# Patient Record
Sex: FEMALE | Race: WHITE | Employment: OTHER | URBAN - METROPOLITAN AREA
[De-identification: names, ages, dates, MRNs, and addresses within clinical notes are randomized per-mention and may not be internally consistent; named-entity substitution may affect disease eponyms.]

---

## 2021-01-26 RX ORDER — ACETAMINOPHEN 500 MG
1000 TABLET ORAL ONCE
Status: CANCELLED | OUTPATIENT
Start: 2021-01-26 | End: 2021-01-26

## 2021-02-12 ENCOUNTER — LAB ENCOUNTER (OUTPATIENT)
Dept: LAB | Facility: HOSPITAL | Age: 71
End: 2021-02-12
Attending: ORTHOPAEDIC SURGERY
Payer: COMMERCIAL

## 2021-02-12 DIAGNOSIS — M16.12 PRIMARY OSTEOARTHRITIS OF LEFT HIP: ICD-10-CM

## 2021-02-12 LAB
ANTIBODY SCREEN: NEGATIVE
RH BLOOD TYPE: POSITIVE
SARS-COV-2 RNA RESP QL NAA+PROBE: NOT DETECTED

## 2021-02-12 PROCEDURE — 86850 RBC ANTIBODY SCREEN: CPT

## 2021-02-12 PROCEDURE — 86901 BLOOD TYPING SEROLOGIC RH(D): CPT

## 2021-02-12 PROCEDURE — 86900 BLOOD TYPING SEROLOGIC ABO: CPT

## 2021-02-12 NOTE — H&P (VIEW-ONLY)
Patient ID: Jim Lamas     Chief Complaint:    Patient presents with:  Pre-Op: pre op left total hip 2/15/21 EH       HPI:    Jim Lamas is a 78year old female who presents today for Patient presents with:  Pre-Op: pre op left total hip 2/15/21 History:   Procedure Laterality Date   • BACK SURGERY  2006    s/p L3-5 laminectomy   • COLONOSCOPY  2007, int hem   • COLONOSCOPY  8/7/20= Diverticulosis, Hemorrhoids    Repeat PRN   • COLONOSCOPY, POSSIBLE BIOPSY, POSSIBLE POLYPECTOMY 39454 N/A 8/7/2020 stools or diarrhea   Musculoskeletal:  Denies numbness tingling or loss of motor function except as outlined above in history of present illness.   Integument:  Denies rash, lesion or ulceration   Neurologic:  Denies headache or focal weakness  Lymphatics: There is moderate to severe joint space narrowing  with mild osteophyte formation off the acetabulum and the head neck junction. There is sclerosis noted in the femoral head and acetabulum. There is no fracture or dislocation.   No periosteal reactions or injury, fracture, dislocation, nonunion, malunion, need for further surgery including hardware failure requiring revision, and continued pain.   It was explained that if tissue has been repaired or reconstructed, there is also a chance of failure which may time and need to be replaced. After having surgery, you may lose your balance and be more likely to fall for a time. You can dislocate your hip which would require an additional procedure and potentially surgery to correct.   You may get a blood clot in

## 2021-02-15 ENCOUNTER — APPOINTMENT (OUTPATIENT)
Dept: GENERAL RADIOLOGY | Facility: HOSPITAL | Age: 71
End: 2021-02-15
Attending: ORTHOPAEDIC SURGERY
Payer: COMMERCIAL

## 2021-02-15 ENCOUNTER — HOSPITAL ENCOUNTER (OUTPATIENT)
Facility: HOSPITAL | Age: 71
Discharge: HOME HEALTH | End: 2021-02-16
Attending: ORTHOPAEDIC SURGERY | Admitting: ORTHOPAEDIC SURGERY
Payer: COMMERCIAL

## 2021-02-15 ENCOUNTER — ANESTHESIA EVENT (OUTPATIENT)
Dept: SURGERY | Facility: HOSPITAL | Age: 71
End: 2021-02-15
Payer: COMMERCIAL

## 2021-02-15 ENCOUNTER — ANESTHESIA (OUTPATIENT)
Dept: SURGERY | Facility: HOSPITAL | Age: 71
End: 2021-02-15
Payer: COMMERCIAL

## 2021-02-15 DIAGNOSIS — M16.12 PRIMARY OSTEOARTHRITIS OF LEFT HIP: Primary | ICD-10-CM

## 2021-02-15 PROCEDURE — 88311 DECALCIFY TISSUE: CPT | Performed by: ORTHOPAEDIC SURGERY

## 2021-02-15 PROCEDURE — 76000 FLUOROSCOPY <1 HR PHYS/QHP: CPT | Performed by: ORTHOPAEDIC SURGERY

## 2021-02-15 PROCEDURE — 88304 TISSUE EXAM BY PATHOLOGIST: CPT | Performed by: ORTHOPAEDIC SURGERY

## 2021-02-15 PROCEDURE — 0SRB04Z REPLACEMENT OF LEFT HIP JOINT WITH CERAMIC ON POLYETHYLENE SYNTHETIC SUBSTITUTE, OPEN APPROACH: ICD-10-PCS | Performed by: ORTHOPAEDIC SURGERY

## 2021-02-15 DEVICE — PREM ST/G7 CP/E1 AA DM LN CER: Type: IMPLANTABLE DEVICE | Status: FUNCTIONAL

## 2021-02-15 DEVICE — BIOLOX® DELTA HEAD, 12/14, 28 X -3.5
Type: IMPLANTABLE DEVICE | Site: HIP | Status: FUNCTIONAL
Brand: BIOLOX® DELTA

## 2021-02-15 DEVICE — G7 DUAL MBLTY LNR 42MM E: Type: IMPLANTABLE DEVICE | Site: HIP | Status: FUNCTIONAL

## 2021-02-15 RX ORDER — HYDROCODONE BITARTRATE AND ACETAMINOPHEN 5; 325 MG/1; MG/1
1 TABLET ORAL AS NEEDED
Status: DISCONTINUED | OUTPATIENT
Start: 2021-02-15 | End: 2021-02-15 | Stop reason: HOSPADM

## 2021-02-15 RX ORDER — NEOSTIGMINE METHYLSULFATE 1 MG/ML
INJECTION INTRAVENOUS AS NEEDED
Status: DISCONTINUED | OUTPATIENT
Start: 2021-02-15 | End: 2021-02-15 | Stop reason: SURG

## 2021-02-15 RX ORDER — DIPHENHYDRAMINE HCL 25 MG
25 CAPSULE ORAL EVERY 4 HOURS PRN
Status: DISCONTINUED | OUTPATIENT
Start: 2021-02-15 | End: 2021-02-16

## 2021-02-15 RX ORDER — NALOXONE HYDROCHLORIDE 0.4 MG/ML
80 INJECTION, SOLUTION INTRAMUSCULAR; INTRAVENOUS; SUBCUTANEOUS AS NEEDED
Status: DISCONTINUED | OUTPATIENT
Start: 2021-02-15 | End: 2021-02-15 | Stop reason: HOSPADM

## 2021-02-15 RX ORDER — DOCUSATE SODIUM 100 MG/1
100 CAPSULE, LIQUID FILLED ORAL 2 TIMES DAILY
Status: DISCONTINUED | OUTPATIENT
Start: 2021-02-15 | End: 2021-02-16

## 2021-02-15 RX ORDER — KETOROLAC TROMETHAMINE 30 MG/ML
15 INJECTION, SOLUTION INTRAMUSCULAR; INTRAVENOUS EVERY 6 HOURS
Status: COMPLETED | OUTPATIENT
Start: 2021-02-15 | End: 2021-02-16

## 2021-02-15 RX ORDER — SENNOSIDES 8.6 MG
17.2 TABLET ORAL NIGHTLY
Status: DISCONTINUED | OUTPATIENT
Start: 2021-02-15 | End: 2021-02-16

## 2021-02-15 RX ORDER — LIDOCAINE HYDROCHLORIDE 10 MG/ML
INJECTION, SOLUTION EPIDURAL; INFILTRATION; INTRACAUDAL; PERINEURAL AS NEEDED
Status: DISCONTINUED | OUTPATIENT
Start: 2021-02-15 | End: 2021-02-15 | Stop reason: SURG

## 2021-02-15 RX ORDER — TRANEXAMIC ACID 10 MG/ML
1000 INJECTION, SOLUTION INTRAVENOUS ONCE
Status: COMPLETED | OUTPATIENT
Start: 2021-02-15 | End: 2021-02-15

## 2021-02-15 RX ORDER — HYDROMORPHONE HYDROCHLORIDE 1 MG/ML
INJECTION, SOLUTION INTRAMUSCULAR; INTRAVENOUS; SUBCUTANEOUS
Status: COMPLETED
Start: 2021-02-15 | End: 2021-02-15

## 2021-02-15 RX ORDER — ASPIRIN 81 MG/1
81 TABLET ORAL 2 TIMES DAILY
Status: DISCONTINUED | OUTPATIENT
Start: 2021-02-15 | End: 2021-02-16

## 2021-02-15 RX ORDER — SODIUM PHOSPHATE, DIBASIC AND SODIUM PHOSPHATE, MONOBASIC 7; 19 G/133ML; G/133ML
1 ENEMA RECTAL ONCE AS NEEDED
Status: DISCONTINUED | OUTPATIENT
Start: 2021-02-15 | End: 2021-02-16

## 2021-02-15 RX ORDER — SODIUM CHLORIDE, SODIUM LACTATE, POTASSIUM CHLORIDE, CALCIUM CHLORIDE 600; 310; 30; 20 MG/100ML; MG/100ML; MG/100ML; MG/100ML
INJECTION, SOLUTION INTRAVENOUS CONTINUOUS
Status: DISCONTINUED | OUTPATIENT
Start: 2021-02-15 | End: 2021-02-16

## 2021-02-15 RX ORDER — HYDROMORPHONE HYDROCHLORIDE 1 MG/ML
0.4 INJECTION, SOLUTION INTRAMUSCULAR; INTRAVENOUS; SUBCUTANEOUS EVERY 2 HOUR PRN
Status: DISCONTINUED | OUTPATIENT
Start: 2021-02-15 | End: 2021-02-16

## 2021-02-15 RX ORDER — DEXAMETHASONE SODIUM PHOSPHATE 10 MG/ML
8 INJECTION, SOLUTION INTRAMUSCULAR; INTRAVENOUS ONCE
Status: COMPLETED | OUTPATIENT
Start: 2021-02-16 | End: 2021-02-16

## 2021-02-15 RX ORDER — MIDAZOLAM HYDROCHLORIDE 1 MG/ML
INJECTION INTRAMUSCULAR; INTRAVENOUS
Status: COMPLETED
Start: 2021-02-15 | End: 2021-02-15

## 2021-02-15 RX ORDER — DEXAMETHASONE SODIUM PHOSPHATE 4 MG/ML
VIAL (ML) INJECTION AS NEEDED
Status: DISCONTINUED | OUTPATIENT
Start: 2021-02-15 | End: 2021-02-15 | Stop reason: SURG

## 2021-02-15 RX ORDER — ONDANSETRON 2 MG/ML
INJECTION INTRAMUSCULAR; INTRAVENOUS AS NEEDED
Status: DISCONTINUED | OUTPATIENT
Start: 2021-02-15 | End: 2021-02-15 | Stop reason: SURG

## 2021-02-15 RX ORDER — SODIUM CHLORIDE 9 MG/ML
INJECTION, SOLUTION INTRAVENOUS CONTINUOUS
Status: DISCONTINUED | OUTPATIENT
Start: 2021-02-15 | End: 2021-02-16

## 2021-02-15 RX ORDER — HYDROCODONE BITARTRATE AND ACETAMINOPHEN 5; 325 MG/1; MG/1
2 TABLET ORAL AS NEEDED
Status: DISCONTINUED | OUTPATIENT
Start: 2021-02-15 | End: 2021-02-15 | Stop reason: HOSPADM

## 2021-02-15 RX ORDER — BISACODYL 10 MG
10 SUPPOSITORY, RECTAL RECTAL
Status: DISCONTINUED | OUTPATIENT
Start: 2021-02-15 | End: 2021-02-16

## 2021-02-15 RX ORDER — ONDANSETRON 2 MG/ML
4 INJECTION INTRAMUSCULAR; INTRAVENOUS EVERY 4 HOURS PRN
Status: DISCONTINUED | OUTPATIENT
Start: 2021-02-15 | End: 2021-02-16

## 2021-02-15 RX ORDER — ACETAMINOPHEN 325 MG/1
TABLET ORAL
Status: COMPLETED
Start: 2021-02-15 | End: 2021-02-15

## 2021-02-15 RX ORDER — DIPHENHYDRAMINE HYDROCHLORIDE 50 MG/ML
25 INJECTION INTRAMUSCULAR; INTRAVENOUS ONCE AS NEEDED
Status: ACTIVE | OUTPATIENT
Start: 2021-02-15 | End: 2021-02-15

## 2021-02-15 RX ORDER — GLYCOPYRROLATE 0.2 MG/ML
INJECTION, SOLUTION INTRAMUSCULAR; INTRAVENOUS AS NEEDED
Status: DISCONTINUED | OUTPATIENT
Start: 2021-02-15 | End: 2021-02-15 | Stop reason: SURG

## 2021-02-15 RX ORDER — TRAMADOL HYDROCHLORIDE 50 MG/1
50 TABLET ORAL EVERY 6 HOURS
Status: DISCONTINUED | OUTPATIENT
Start: 2021-02-15 | End: 2021-02-16

## 2021-02-15 RX ORDER — HYDROMORPHONE HYDROCHLORIDE 1 MG/ML
0.4 INJECTION, SOLUTION INTRAMUSCULAR; INTRAVENOUS; SUBCUTANEOUS EVERY 5 MIN PRN
Status: DISCONTINUED | OUTPATIENT
Start: 2021-02-15 | End: 2021-02-15 | Stop reason: HOSPADM

## 2021-02-15 RX ORDER — ONDANSETRON 2 MG/ML
4 INJECTION INTRAMUSCULAR; INTRAVENOUS AS NEEDED
Status: DISCONTINUED | OUTPATIENT
Start: 2021-02-15 | End: 2021-02-15 | Stop reason: HOSPADM

## 2021-02-15 RX ORDER — CEFAZOLIN SODIUM/WATER 2 G/20 ML
2 SYRINGE (ML) INTRAVENOUS EVERY 8 HOURS
Status: COMPLETED | OUTPATIENT
Start: 2021-02-15 | End: 2021-02-16

## 2021-02-15 RX ORDER — KETOROLAC TROMETHAMINE 30 MG/ML
INJECTION, SOLUTION INTRAMUSCULAR; INTRAVENOUS
Status: COMPLETED
Start: 2021-02-15 | End: 2021-02-15

## 2021-02-15 RX ORDER — PROCHLORPERAZINE EDISYLATE 5 MG/ML
10 INJECTION INTRAMUSCULAR; INTRAVENOUS EVERY 6 HOURS PRN
Status: DISCONTINUED | OUTPATIENT
Start: 2021-02-15 | End: 2021-02-16

## 2021-02-15 RX ORDER — POLYETHYLENE GLYCOL 3350 17 G/17G
17 POWDER, FOR SOLUTION ORAL DAILY PRN
Status: DISCONTINUED | OUTPATIENT
Start: 2021-02-15 | End: 2021-02-16

## 2021-02-15 RX ORDER — ACETAMINOPHEN 500 MG
1000 TABLET ORAL EVERY 6 HOURS PRN
Status: ON HOLD | COMMUNITY
End: 2021-02-16

## 2021-02-15 RX ORDER — ROCURONIUM BROMIDE 10 MG/ML
INJECTION, SOLUTION INTRAVENOUS AS NEEDED
Status: DISCONTINUED | OUTPATIENT
Start: 2021-02-15 | End: 2021-02-15 | Stop reason: SURG

## 2021-02-15 RX ORDER — DIPHENHYDRAMINE HYDROCHLORIDE 50 MG/ML
12.5 INJECTION INTRAMUSCULAR; INTRAVENOUS EVERY 4 HOURS PRN
Status: DISCONTINUED | OUTPATIENT
Start: 2021-02-15 | End: 2021-02-16

## 2021-02-15 RX ORDER — OXYCODONE HYDROCHLORIDE 5 MG/1
2.5 TABLET ORAL EVERY 4 HOURS PRN
Status: DISCONTINUED | OUTPATIENT
Start: 2021-02-15 | End: 2021-02-16

## 2021-02-15 RX ORDER — ACETAMINOPHEN 325 MG/1
650 TABLET ORAL ONCE
Status: COMPLETED | OUTPATIENT
Start: 2021-02-15 | End: 2021-02-15

## 2021-02-15 RX ORDER — ONDANSETRON 2 MG/ML
INJECTION INTRAMUSCULAR; INTRAVENOUS
Status: COMPLETED
Start: 2021-02-15 | End: 2021-02-15

## 2021-02-15 RX ORDER — ACETAMINOPHEN 325 MG/1
650 TABLET ORAL 4 TIMES DAILY
Status: DISCONTINUED | OUTPATIENT
Start: 2021-02-15 | End: 2021-02-16

## 2021-02-15 RX ORDER — OXYCODONE HYDROCHLORIDE 5 MG/1
5 TABLET ORAL EVERY 4 HOURS PRN
Status: DISCONTINUED | OUTPATIENT
Start: 2021-02-15 | End: 2021-02-16

## 2021-02-15 RX ORDER — TEMAZEPAM 15 MG/1
15 CAPSULE ORAL NIGHTLY PRN
Status: DISCONTINUED | OUTPATIENT
Start: 2021-02-15 | End: 2021-02-16

## 2021-02-15 RX ORDER — CEFAZOLIN SODIUM/WATER 2 G/20 ML
2 SYRINGE (ML) INTRAVENOUS ONCE
Status: COMPLETED | OUTPATIENT
Start: 2021-02-15 | End: 2021-02-15

## 2021-02-15 RX ORDER — HYDROMORPHONE HYDROCHLORIDE 1 MG/ML
0.2 INJECTION, SOLUTION INTRAMUSCULAR; INTRAVENOUS; SUBCUTANEOUS EVERY 2 HOUR PRN
Status: DISCONTINUED | OUTPATIENT
Start: 2021-02-15 | End: 2021-02-16

## 2021-02-15 RX ORDER — SODIUM CHLORIDE, SODIUM LACTATE, POTASSIUM CHLORIDE, CALCIUM CHLORIDE 600; 310; 30; 20 MG/100ML; MG/100ML; MG/100ML; MG/100ML
INJECTION, SOLUTION INTRAVENOUS CONTINUOUS
Status: DISCONTINUED | OUTPATIENT
Start: 2021-02-15 | End: 2021-02-15 | Stop reason: HOSPADM

## 2021-02-15 RX ORDER — MIDAZOLAM HYDROCHLORIDE 1 MG/ML
1 INJECTION INTRAMUSCULAR; INTRAVENOUS ONCE
Status: COMPLETED | OUTPATIENT
Start: 2021-02-15 | End: 2021-02-15

## 2021-02-15 RX ADMIN — ROCURONIUM BROMIDE 50 MG: 10 INJECTION, SOLUTION INTRAVENOUS at 14:41:00

## 2021-02-15 RX ADMIN — CEFAZOLIN SODIUM/WATER 2 G: 2 G/20 ML SYRINGE (ML) INTRAVENOUS at 14:46:00

## 2021-02-15 RX ADMIN — TRANEXAMIC ACID 1000 MG: 10 INJECTION, SOLUTION INTRAVENOUS at 14:46:00

## 2021-02-15 RX ADMIN — SODIUM CHLORIDE, SODIUM LACTATE, POTASSIUM CHLORIDE, CALCIUM CHLORIDE: 600; 310; 30; 20 INJECTION, SOLUTION INTRAVENOUS at 16:16:00

## 2021-02-15 RX ADMIN — ROCURONIUM BROMIDE 15 MG: 10 INJECTION, SOLUTION INTRAVENOUS at 14:54:00

## 2021-02-15 RX ADMIN — LIDOCAINE HYDROCHLORIDE 25 MG: 10 INJECTION, SOLUTION EPIDURAL; INFILTRATION; INTRACAUDAL; PERINEURAL at 14:41:00

## 2021-02-15 RX ADMIN — DEXAMETHASONE SODIUM PHOSPHATE 4 MG: 4 MG/ML VIAL (ML) INJECTION at 14:52:00

## 2021-02-15 RX ADMIN — GLYCOPYRROLATE 0.8 MG: 0.2 INJECTION, SOLUTION INTRAMUSCULAR; INTRAVENOUS at 16:12:00

## 2021-02-15 RX ADMIN — SODIUM CHLORIDE, SODIUM LACTATE, POTASSIUM CHLORIDE, CALCIUM CHLORIDE: 600; 310; 30; 20 INJECTION, SOLUTION INTRAVENOUS at 16:26:00

## 2021-02-15 RX ADMIN — NEOSTIGMINE METHYLSULFATE 4.5 MG: 1 INJECTION INTRAVENOUS at 16:12:00

## 2021-02-15 RX ADMIN — ONDANSETRON 4 MG: 2 INJECTION INTRAMUSCULAR; INTRAVENOUS at 16:00:00

## 2021-02-15 NOTE — INTERVAL H&P NOTE
Pre-op Diagnosis: Primary osteoarthritis of left hip [M16.12]    The above referenced H&P was reviewed by Eze Garcia MD on 2/15/2021, the patient was examined and no significant changes have occurred in the patient's condition since the H&P was perform

## 2021-02-15 NOTE — ANESTHESIA PREPROCEDURE EVALUATION
PRE-OP EVALUATION    Patient Name: Jenise Sutherland    Pre-op Diagnosis: Primary osteoarthritis of left hip [M16.12]    Procedure(s):  LEFT ANTERIOR TOTAL HIP ARTHROPLASTY     Surgeon(s) and Role:     Glynn Acuna MD - Primary    Pre-op jordyn Alonso Evaluation        Anesthetic Complications  (-) history of anesthetic complications         GI/Hepatic/Renal    Negative GI/hepatic/renal ROS. Cardiovascular    Negative cardiovascular ROS.     Exercise tolerance: good     MET: > Airway      Mallampati: II  Mouth opening: >3 FB  TM distance: 4 - 6 cm  Neck ROM: full Cardiovascular      Rhythm: regular  Rate: normal     Dental             Pulmonary      Breath sounds clear to auscultation bilaterally.                Other fin

## 2021-02-15 NOTE — ANESTHESIA POSTPROCEDURE EVALUATION
Altaf Patient Status:  Outpatient in a Bed   Age/Gender 78year old female MRN [id number]   [facility] SURGERY Attending Rao Christianson MD   Hosp Day # 0 PCP Deana Costello MD       Anesthesia Post-op Not

## 2021-02-15 NOTE — OPERATIVE REPORT
OPERATIVE REPORT    Facility:  [facility]    Patient Name:  Kasi Singer    Age/Gender:  78year old female  :  1950    MRN:  [id number]    Date of Operation:  2/15/2021    Preoperative Diagnosis:  LEFT HIP OSTEOARTHRITIS    Postopera aspect of the femoral neck. Osteotomy of the femoral neck was performed using a sagittal saw. A corkscrew was used to remove the femoral head. Retractors were placed anterior and posterior to the acetabulum.   The hip labrum as well as the soft tissue in together closing the anterior hip capsule. Local anesthetic and pain medicine was injected into the hip capsule and surrounding soft tissues. The fascia nicky was closed with absorbable suture. Skin was then closed in 2 layers with absorbable suture.   Kemar Burks

## 2021-02-16 ENCOUNTER — APPOINTMENT (OUTPATIENT)
Dept: GENERAL RADIOLOGY | Facility: HOSPITAL | Age: 71
End: 2021-02-16
Attending: ORTHOPAEDIC SURGERY
Payer: COMMERCIAL

## 2021-02-16 VITALS
RESPIRATION RATE: 18 BRPM | HEIGHT: 69 IN | TEMPERATURE: 99 F | DIASTOLIC BLOOD PRESSURE: 37 MMHG | HEART RATE: 57 BPM | WEIGHT: 198 LBS | BODY MASS INDEX: 29.33 KG/M2 | OXYGEN SATURATION: 94 % | SYSTOLIC BLOOD PRESSURE: 110 MMHG

## 2021-02-16 PROCEDURE — 97161 PT EVAL LOW COMPLEX 20 MIN: CPT

## 2021-02-16 PROCEDURE — 97535 SELF CARE MNGMENT TRAINING: CPT

## 2021-02-16 PROCEDURE — 97165 OT EVAL LOW COMPLEX 30 MIN: CPT

## 2021-02-16 PROCEDURE — 97116 GAIT TRAINING THERAPY: CPT

## 2021-02-16 PROCEDURE — 73501 X-RAY EXAM HIP UNI 1 VIEW: CPT | Performed by: ORTHOPAEDIC SURGERY

## 2021-02-16 RX ORDER — NALOXONE HYDROCHLORIDE 4 MG/.1ML
4 SPRAY, METERED NASAL AS NEEDED
Qty: 1 KIT | Refills: 0 | Status: SHIPPED | OUTPATIENT
Start: 2021-02-16 | End: 2021-05-26

## 2021-02-16 RX ORDER — ASPIRIN 81 MG/1
81 TABLET ORAL 2 TIMES DAILY
Qty: 84 TABLET | Refills: 0 | Status: SHIPPED | OUTPATIENT
Start: 2021-02-16 | End: 2021-05-26

## 2021-02-16 RX ORDER — ACETAMINOPHEN 325 MG/1
650 TABLET ORAL EVERY 4 HOURS
Qty: 168 TABLET | Refills: 0 | Status: SHIPPED | OUTPATIENT
Start: 2021-02-16 | End: 2021-03-02

## 2021-02-16 RX ORDER — CELECOXIB 200 MG/1
200 CAPSULE ORAL DAILY
Qty: 30 CAPSULE | Refills: 0 | Status: SHIPPED | OUTPATIENT
Start: 2021-02-16 | End: 2021-03-18

## 2021-02-16 RX ORDER — ONDANSETRON 4 MG/1
4 TABLET, FILM COATED ORAL EVERY 8 HOURS PRN
Qty: 30 TABLET | Refills: 0 | Status: SHIPPED | OUTPATIENT
Start: 2021-02-16 | End: 2021-05-26

## 2021-02-16 RX ORDER — TRAMADOL HYDROCHLORIDE 50 MG/1
50 TABLET ORAL
Qty: 40 TABLET | Refills: 0 | Status: SHIPPED | OUTPATIENT
Start: 2021-02-16 | End: 2021-05-26

## 2021-02-16 RX ORDER — OXYCODONE HYDROCHLORIDE 5 MG/1
2.5 TABLET ORAL EVERY 4 HOURS PRN
Qty: 30 TABLET | Refills: 0 | Status: SHIPPED | OUTPATIENT
Start: 2021-02-16 | End: 2021-05-26

## 2021-02-16 RX ORDER — SENNA AND DOCUSATE SODIUM 50; 8.6 MG/1; MG/1
1 TABLET, FILM COATED ORAL DAILY
Qty: 20 TABLET | Refills: 2 | Status: SHIPPED | OUTPATIENT
Start: 2021-02-16 | End: 2021-05-26

## 2021-02-16 NOTE — PLAN OF CARE
Patient doing well, reported having minimal pain to left surgical hip. Aquacel dressing to left hip looks clean, dry and intact. Denies having numbness to LLE. PT and OT seen patient. Ambulated in the gutierrez and did stairs.   Dr. Salina Sheppard seen and examined

## 2021-02-16 NOTE — PROGRESS NOTES
Progress Note    Patient: Rob Rainey  Medical record #: [id number]    Rob Rainey is a 78year old  female who is POD #1 left UZMA. The patient's main complaint today is surgical pain at the operative site. Denies paresthesias/CP/SOA.  Gali Mcdonnell QID    •  oxyCODONE HCl (OXY-IR) cap/tab 2.5 mg, 2.5 mg, Oral, Q4H PRN    Or    •  oxyCODONE HCl (OXY-IR) cap/tab 5 mg, 5 mg, Oral, Q4H PRN    •  HYDROmorphone HCl (DILAUDID) 1 MG/ML injection 0.2 mg, 0.2 mg, Intravenous, Q2H PRN    Or    •  HYDROmorphone swelling  Bernie's sign negative  Compartments soft  No signs or symptoms of DVT or compartment syndrome      Labs:          Assessment: 78year old  female POD #1 left UZMA    Plan:  Karla-op Glucose Goal control <140  TEDs, SCDs, IS  mobilize with PT, WBAT

## 2021-02-16 NOTE — OCCUPATIONAL THERAPY NOTE
OCCUPATIONAL THERAPY QUICK EVALUATION - INPATIENT    Room Number: 379/379-A  Evaluation Date: 2/16/2021     Type of Evaluation: Quick Eval  Presenting Problem: s/p L UZMA on 2/15     Physician Order: IP Consult to Occupational Therapy  Reason for Therapy: Dominance: Right  Drives: Yes  Patient Regularly Uses: Reading glasses    Prior Level of Function: Pt lives at home with her . Pt is typically independent with all ADL and functional mobility without a device. Pt's spouse can assist as needed.      Marion Stewart supervision while sitting at EOB. Pt performed sit>stand with RW and supervision assistance with min verbal/visual/tactile cues for safety with RW use and hand placement.  Pt performed functional mobility with supervision assistance and RW x approx 20ft>bat no skilled Occupational Therapy needs at this time. Patient discharged from Occupational Therapy services. Please re-order if a new functional limitation presents during this admission.     Patient was able to achieve the following goals:  Patient able to

## 2021-02-16 NOTE — PLAN OF CARE
Alert and oriented,V/S stable,IVF infusing,pain managed with scheduled medications doing well. Denies nausea,snacks provided. Instructed about using IS,as well as doing foot and ankle pump exercises. Plan of care reinforced,as well as safety. Call light kept w

## 2021-02-16 NOTE — HOME CARE LIAISON
Met with patient at the bedside. Patient is agreeable to [facility]. Residential brochure provided with contact information. All questions addressed and answered. JOSE Olson updated.

## 2021-02-16 NOTE — PHYSICAL THERAPY NOTE
PHYSICAL THERAPY QUICK EVALUATION - INPATIENT    Room Number: 379/379-A  Evaluation Date: 2/16/2021  Presenting Problem: s/p left anterior UZMA 2/15/21  Physician Order: PT Eval and Treat    Problem List  Active Problems:    * No active hospital problems. Techniques:  Activity promotion   RANGE OF MOTION AND STRENGTH ASSESSMENT  Upper extremity ROM and strength are within functional limits     Lower extremity ROM is within functional limits except left hip due to surgery  Lower extremity strength is within f through gait pattern, left hip went from 2 at rest to 6 with activity, however, pt very motivated to amb, tolerated well despite pain. Pt amb to and from PT gym with seated rest break between, good tolerance.      Pt instructed in amb on stairs with use of admission. GOALS  Patient was able to achieve the following goals . ..     Patient was able to transfer Safely and independently   Patient able to ambulate on level surfaces Safely and independently  supervision   PPE worn by therapist throughout session,

## 2021-02-16 NOTE — PROGRESS NOTES
Austyn at bedside. Reviewed indications, side effects of pain medication/narcotics and constipation prevention.  Stressed importance of increased fluids/roughage in diet, continued use of stool softeners along with laxatives and suppositories as needed

## 2021-02-16 NOTE — CM/SW NOTE
02/16/21 1100   CM/SW Referral Data   Referral Source Social Work (self-referral)   Reason for Referral Discharge planning   Discharge Needs   Anticipated D/C needs Home health care       HOME SITUATION  Type of Home: House   Home Layout: Two level(spli

## 2021-03-01 NOTE — OR PREOP
Attempted to reach patient to follow up on recent visit. LM to call me back if she desires. (Direct phone # provided).

## 2021-05-26 PROBLEM — Z12.11 COLON CANCER SCREENING: Status: ACTIVE | Noted: 2021-05-26

## 2021-07-09 PROCEDURE — 88173 CYTOPATH EVAL FNA REPORT: CPT | Performed by: OTOLARYNGOLOGY

## 2021-07-09 PROCEDURE — 88305 TISSUE EXAM BY PATHOLOGIST: CPT | Performed by: OTOLARYNGOLOGY

## 2021-07-09 PROCEDURE — 88342 IMHCHEM/IMCYTCHM 1ST ANTB: CPT | Performed by: OTOLARYNGOLOGY

## 2021-08-27 ENCOUNTER — LAB ENCOUNTER (OUTPATIENT)
Dept: LAB | Age: 71
End: 2021-08-27
Attending: OTOLARYNGOLOGY
Payer: COMMERCIAL

## 2021-08-27 DIAGNOSIS — D49.7 THYROID NEOPLASM: ICD-10-CM

## 2021-08-29 LAB — SARS-COV-2 RNA RESP QL NAA+PROBE: NOT DETECTED

## 2021-08-30 ENCOUNTER — HOSPITAL ENCOUNTER (OUTPATIENT)
Facility: HOSPITAL | Age: 71
Discharge: HOME | End: 2021-08-31
Attending: OTOLARYNGOLOGY | Admitting: OTOLARYNGOLOGY
Payer: COMMERCIAL

## 2021-08-30 ENCOUNTER — ANESTHESIA (OUTPATIENT)
Dept: SURGERY | Facility: HOSPITAL | Age: 71
End: 2021-08-30
Payer: COMMERCIAL

## 2021-08-30 ENCOUNTER — ANESTHESIA EVENT (OUTPATIENT)
Dept: SURGERY | Facility: HOSPITAL | Age: 71
End: 2021-08-30
Payer: COMMERCIAL

## 2021-08-30 DIAGNOSIS — D49.7 THYROID NEOPLASM: Primary | ICD-10-CM

## 2021-08-30 LAB
ALBUMIN SERPL-MCNC: 3.8 G/DL (ref 3.4–5)
ANION GAP SERPL CALC-SCNC: 5 MMOL/L (ref 0–18)
BUN BLD-MCNC: 15 MG/DL (ref 7–18)
CALCIUM BLD-MCNC: 8.7 MG/DL (ref 8.5–10.1)
CHLORIDE SERPL-SCNC: 113 MMOL/L (ref 98–112)
CO2 SERPL-SCNC: 24 MMOL/L (ref 21–32)
CREAT BLD-MCNC: 0.99 MG/DL
GLUCOSE BLD-MCNC: 129 MG/DL (ref 70–99)
HAV IGM SER QL: 2.1 MG/DL (ref 1.6–2.6)
OSMOLALITY SERPL CALC.SUM OF ELEC: 297 MOSM/KG (ref 275–295)
PHOSPHATE SERPL-MCNC: 2.7 MG/DL (ref 2.5–4.9)
POTASSIUM SERPL-SCNC: 4 MMOL/L (ref 3.5–5.1)
PTH-INTACT SERPL-MCNC: 69.5 PG/ML (ref 18.5–88)
SODIUM SERPL-SCNC: 142 MMOL/L (ref 136–145)

## 2021-08-30 PROCEDURE — 82040 ASSAY OF SERUM ALBUMIN: CPT | Performed by: OTOLARYNGOLOGY

## 2021-08-30 PROCEDURE — 83970 ASSAY OF PARATHORMONE: CPT | Performed by: OTOLARYNGOLOGY

## 2021-08-30 PROCEDURE — 88341 IMHCHEM/IMCYTCHM EA ADD ANTB: CPT | Performed by: OTOLARYNGOLOGY

## 2021-08-30 PROCEDURE — 0GBJ0ZZ EXCISION OF THYROID GLAND ISTHMUS, OPEN APPROACH: ICD-10-PCS | Performed by: OTOLARYNGOLOGY

## 2021-08-30 PROCEDURE — 80048 BASIC METABOLIC PNL TOTAL CA: CPT | Performed by: OTOLARYNGOLOGY

## 2021-08-30 PROCEDURE — 0GTK0ZZ RESECTION OF THYROID GLAND, OPEN APPROACH: ICD-10-PCS | Performed by: OTOLARYNGOLOGY

## 2021-08-30 PROCEDURE — 83735 ASSAY OF MAGNESIUM: CPT | Performed by: OTOLARYNGOLOGY

## 2021-08-30 PROCEDURE — 88321 CONSLTJ&REPRT SLD PREP ELSWR: CPT | Performed by: OTOLARYNGOLOGY

## 2021-08-30 PROCEDURE — 88307 TISSUE EXAM BY PATHOLOGIST: CPT | Performed by: OTOLARYNGOLOGY

## 2021-08-30 PROCEDURE — 88342 IMHCHEM/IMCYTCHM 1ST ANTB: CPT | Performed by: OTOLARYNGOLOGY

## 2021-08-30 PROCEDURE — 84100 ASSAY OF PHOSPHORUS: CPT | Performed by: OTOLARYNGOLOGY

## 2021-08-30 RX ORDER — DEXAMETHASONE SODIUM PHOSPHATE 4 MG/ML
VIAL (ML) INJECTION AS NEEDED
Status: DISCONTINUED | OUTPATIENT
Start: 2021-08-30 | End: 2021-08-30 | Stop reason: SURG

## 2021-08-30 RX ORDER — HYDROCODONE BITARTRATE AND ACETAMINOPHEN 5; 325 MG/1; MG/1
2 TABLET ORAL AS NEEDED
Status: DISCONTINUED | OUTPATIENT
Start: 2021-08-30 | End: 2021-08-30 | Stop reason: HOSPADM

## 2021-08-30 RX ORDER — CEFAZOLIN SODIUM/WATER 2 G/20 ML
SYRINGE (ML) INTRAVENOUS AS NEEDED
Status: DISCONTINUED | OUTPATIENT
Start: 2021-08-30 | End: 2021-08-30 | Stop reason: SURG

## 2021-08-30 RX ORDER — HYDROCODONE BITARTRATE AND ACETAMINOPHEN 5; 325 MG/1; MG/1
2 TABLET ORAL EVERY 4 HOURS PRN
Status: DISCONTINUED | OUTPATIENT
Start: 2021-08-30 | End: 2021-08-31

## 2021-08-30 RX ORDER — ONDANSETRON 2 MG/ML
INJECTION INTRAMUSCULAR; INTRAVENOUS AS NEEDED
Status: DISCONTINUED | OUTPATIENT
Start: 2021-08-30 | End: 2021-08-30 | Stop reason: SURG

## 2021-08-30 RX ORDER — CALCITRIOL 0.25 UG/1
CAPSULE, LIQUID FILLED ORAL
Status: COMPLETED
Start: 2021-08-30 | End: 2021-08-30

## 2021-08-30 RX ORDER — MEPERIDINE HYDROCHLORIDE 25 MG/ML
12.5 INJECTION INTRAMUSCULAR; INTRAVENOUS; SUBCUTANEOUS AS NEEDED
Status: DISCONTINUED | OUTPATIENT
Start: 2021-08-30 | End: 2021-08-30 | Stop reason: HOSPADM

## 2021-08-30 RX ORDER — HYDROMORPHONE HYDROCHLORIDE 1 MG/ML
0.4 INJECTION, SOLUTION INTRAMUSCULAR; INTRAVENOUS; SUBCUTANEOUS EVERY 5 MIN PRN
Status: DISCONTINUED | OUTPATIENT
Start: 2021-08-30 | End: 2021-08-30 | Stop reason: HOSPADM

## 2021-08-30 RX ORDER — SODIUM CHLORIDE, SODIUM LACTATE, POTASSIUM CHLORIDE, CALCIUM CHLORIDE 600; 310; 30; 20 MG/100ML; MG/100ML; MG/100ML; MG/100ML
INJECTION, SOLUTION INTRAVENOUS CONTINUOUS
Status: DISCONTINUED | OUTPATIENT
Start: 2021-08-30 | End: 2021-08-30 | Stop reason: HOSPADM

## 2021-08-30 RX ORDER — GLYCOPYRROLATE 0.2 MG/ML
INJECTION, SOLUTION INTRAMUSCULAR; INTRAVENOUS AS NEEDED
Status: DISCONTINUED | OUTPATIENT
Start: 2021-08-30 | End: 2021-08-30 | Stop reason: SURG

## 2021-08-30 RX ORDER — CALCIUM CARBONATE 500(1250)
1000 TABLET ORAL ONCE
Status: COMPLETED | OUTPATIENT
Start: 2021-08-30 | End: 2021-08-30

## 2021-08-30 RX ORDER — METOCLOPRAMIDE HYDROCHLORIDE 5 MG/ML
INJECTION INTRAMUSCULAR; INTRAVENOUS AS NEEDED
Status: DISCONTINUED | OUTPATIENT
Start: 2021-08-30 | End: 2021-08-30 | Stop reason: SURG

## 2021-08-30 RX ORDER — ATORVASTATIN CALCIUM 10 MG/1
10 TABLET, FILM COATED ORAL DAILY
Status: DISCONTINUED | OUTPATIENT
Start: 2021-08-31 | End: 2021-08-31

## 2021-08-30 RX ORDER — NALOXONE HYDROCHLORIDE 0.4 MG/ML
80 INJECTION, SOLUTION INTRAMUSCULAR; INTRAVENOUS; SUBCUTANEOUS AS NEEDED
Status: DISCONTINUED | OUTPATIENT
Start: 2021-08-30 | End: 2021-08-30 | Stop reason: HOSPADM

## 2021-08-30 RX ORDER — LIDOCAINE HYDROCHLORIDE AND EPINEPHRINE 10; 10 MG/ML; UG/ML
INJECTION, SOLUTION INFILTRATION; PERINEURAL AS NEEDED
Status: DISCONTINUED | OUTPATIENT
Start: 2021-08-30 | End: 2021-08-30 | Stop reason: HOSPADM

## 2021-08-30 RX ORDER — HEPARIN SODIUM 5000 [USP'U]/ML
5000 INJECTION, SOLUTION INTRAVENOUS; SUBCUTANEOUS EVERY 8 HOURS SCHEDULED
Status: DISCONTINUED | OUTPATIENT
Start: 2021-08-30 | End: 2021-08-31

## 2021-08-30 RX ORDER — HYDROCODONE BITARTRATE AND ACETAMINOPHEN 5; 325 MG/1; MG/1
1 TABLET ORAL EVERY 6 HOURS PRN
Status: DISCONTINUED | OUTPATIENT
Start: 2021-08-30 | End: 2021-08-30

## 2021-08-30 RX ORDER — MIDAZOLAM HYDROCHLORIDE 1 MG/ML
INJECTION INTRAMUSCULAR; INTRAVENOUS AS NEEDED
Status: DISCONTINUED | OUTPATIENT
Start: 2021-08-30 | End: 2021-08-30 | Stop reason: SURG

## 2021-08-30 RX ORDER — HYDROMORPHONE HYDROCHLORIDE 1 MG/ML
INJECTION, SOLUTION INTRAMUSCULAR; INTRAVENOUS; SUBCUTANEOUS
Status: COMPLETED
Start: 2021-08-30 | End: 2021-08-30

## 2021-08-30 RX ORDER — ONDANSETRON 2 MG/ML
8 INJECTION INTRAMUSCULAR; INTRAVENOUS EVERY 8 HOURS PRN
Status: DISCONTINUED | OUTPATIENT
Start: 2021-08-30 | End: 2021-08-31

## 2021-08-30 RX ORDER — CALCITRIOL 0.25 UG/1
0.25 CAPSULE, LIQUID FILLED ORAL ONCE
Status: COMPLETED | OUTPATIENT
Start: 2021-08-30 | End: 2021-08-30

## 2021-08-30 RX ORDER — ACETAMINOPHEN 500 MG
1000 TABLET ORAL ONCE
Status: DISCONTINUED | OUTPATIENT
Start: 2021-08-30 | End: 2021-08-30

## 2021-08-30 RX ORDER — CALCIUM CARBONATE 500(1250)
TABLET ORAL
Status: COMPLETED
Start: 2021-08-30 | End: 2021-08-30

## 2021-08-30 RX ORDER — LIDOCAINE HYDROCHLORIDE 10 MG/ML
INJECTION, SOLUTION EPIDURAL; INFILTRATION; INTRACAUDAL; PERINEURAL AS NEEDED
Status: DISCONTINUED | OUTPATIENT
Start: 2021-08-30 | End: 2021-08-30 | Stop reason: SURG

## 2021-08-30 RX ORDER — ATORVASTATIN CALCIUM 10 MG/1
10 TABLET, FILM COATED ORAL EVERY MORNING
COMMUNITY

## 2021-08-30 RX ORDER — PHENYLEPHRINE HCL 10 MG/ML
VIAL (ML) INJECTION AS NEEDED
Status: DISCONTINUED | OUTPATIENT
Start: 2021-08-30 | End: 2021-08-30 | Stop reason: SURG

## 2021-08-30 RX ORDER — HYDROCODONE BITARTRATE AND ACETAMINOPHEN 5; 325 MG/1; MG/1
1 TABLET ORAL AS NEEDED
Status: DISCONTINUED | OUTPATIENT
Start: 2021-08-30 | End: 2021-08-30 | Stop reason: HOSPADM

## 2021-08-30 RX ORDER — LEVOTHYROXINE SODIUM 88 UG/1
88 TABLET ORAL
Status: DISCONTINUED | OUTPATIENT
Start: 2021-08-31 | End: 2021-08-31

## 2021-08-30 RX ORDER — ROCURONIUM BROMIDE 10 MG/ML
INJECTION, SOLUTION INTRAVENOUS AS NEEDED
Status: DISCONTINUED | OUTPATIENT
Start: 2021-08-30 | End: 2021-08-30 | Stop reason: SURG

## 2021-08-30 RX ORDER — HYDROCODONE BITARTRATE AND ACETAMINOPHEN 5; 325 MG/1; MG/1
1 TABLET ORAL EVERY 4 HOURS PRN
Status: DISCONTINUED | OUTPATIENT
Start: 2021-08-30 | End: 2021-08-31

## 2021-08-30 RX ORDER — SODIUM CHLORIDE, SODIUM LACTATE, POTASSIUM CHLORIDE, CALCIUM CHLORIDE 600; 310; 30; 20 MG/100ML; MG/100ML; MG/100ML; MG/100ML
INJECTION, SOLUTION INTRAVENOUS CONTINUOUS
Status: DISCONTINUED | OUTPATIENT
Start: 2021-08-30 | End: 2021-08-31

## 2021-08-30 RX ORDER — ONDANSETRON 2 MG/ML
4 INJECTION INTRAMUSCULAR; INTRAVENOUS AS NEEDED
Status: DISCONTINUED | OUTPATIENT
Start: 2021-08-30 | End: 2021-08-30 | Stop reason: HOSPADM

## 2021-08-30 RX ORDER — SODIUM CHLORIDE AND POTASSIUM CHLORIDE .9; .15 G/100ML; G/100ML
SOLUTION INTRAVENOUS CONTINUOUS
Status: DISCONTINUED | OUTPATIENT
Start: 2021-08-30 | End: 2021-08-31

## 2021-08-30 RX ORDER — EPHEDRINE SULFATE 50 MG/ML
INJECTION INTRAVENOUS AS NEEDED
Status: DISCONTINUED | OUTPATIENT
Start: 2021-08-30 | End: 2021-08-30 | Stop reason: SURG

## 2021-08-30 RX ORDER — ACETAMINOPHEN 500 MG
1000 TABLET ORAL ONCE
Status: ON HOLD | COMMUNITY
End: 2021-08-31

## 2021-08-30 RX ADMIN — LIDOCAINE HYDROCHLORIDE 50 MG: 10 INJECTION, SOLUTION EPIDURAL; INFILTRATION; INTRACAUDAL; PERINEURAL at 11:27:00

## 2021-08-30 RX ADMIN — EPHEDRINE SULFATE 10 MG: 50 INJECTION INTRAVENOUS at 11:45:00

## 2021-08-30 RX ADMIN — SODIUM CHLORIDE, SODIUM LACTATE, POTASSIUM CHLORIDE, CALCIUM CHLORIDE: 600; 310; 30; 20 INJECTION, SOLUTION INTRAVENOUS at 11:27:00

## 2021-08-30 RX ADMIN — PHENYLEPHRINE HCL 100 MCG: 10 MG/ML VIAL (ML) INJECTION at 12:06:00

## 2021-08-30 RX ADMIN — CEFAZOLIN SODIUM/WATER 2 G: 2 G/20 ML SYRINGE (ML) INTRAVENOUS at 11:29:00

## 2021-08-30 RX ADMIN — PHENYLEPHRINE HCL 100 MCG: 10 MG/ML VIAL (ML) INJECTION at 12:07:00

## 2021-08-30 RX ADMIN — GLYCOPYRROLATE 0.2 MG: 0.2 INJECTION, SOLUTION INTRAMUSCULAR; INTRAVENOUS at 11:42:00

## 2021-08-30 RX ADMIN — EPHEDRINE SULFATE 15 MG: 50 INJECTION INTRAVENOUS at 11:42:00

## 2021-08-30 RX ADMIN — EPHEDRINE SULFATE 10 MG: 50 INJECTION INTRAVENOUS at 11:43:00

## 2021-08-30 RX ADMIN — ONDANSETRON 4 MG: 2 INJECTION INTRAMUSCULAR; INTRAVENOUS at 11:27:00

## 2021-08-30 RX ADMIN — MIDAZOLAM HYDROCHLORIDE 2 MG: 1 INJECTION INTRAMUSCULAR; INTRAVENOUS at 11:26:00

## 2021-08-30 RX ADMIN — ROCURONIUM BROMIDE 5 MG: 10 INJECTION, SOLUTION INTRAVENOUS at 11:27:00

## 2021-08-30 RX ADMIN — SODIUM CHLORIDE, SODIUM LACTATE, POTASSIUM CHLORIDE, CALCIUM CHLORIDE: 600; 310; 30; 20 INJECTION, SOLUTION INTRAVENOUS at 12:06:00

## 2021-08-30 RX ADMIN — EPHEDRINE SULFATE 15 MG: 50 INJECTION INTRAVENOUS at 11:44:00

## 2021-08-30 RX ADMIN — METOCLOPRAMIDE HYDROCHLORIDE 10 MG: 5 INJECTION INTRAMUSCULAR; INTRAVENOUS at 11:27:00

## 2021-08-30 RX ADMIN — SODIUM CHLORIDE, SODIUM LACTATE, POTASSIUM CHLORIDE, CALCIUM CHLORIDE: 600; 310; 30; 20 INJECTION, SOLUTION INTRAVENOUS at 12:41:00

## 2021-08-30 RX ADMIN — DEXAMETHASONE SODIUM PHOSPHATE 8 MG: 4 MG/ML VIAL (ML) INJECTION at 11:27:00

## 2021-08-30 NOTE — H&P
Pre-op Diagnosis: Thyroid neoplasm [D49.7]    The H&P by Dr. Héctor Patel dated 8/18/21 was reviewed by Mily Guillen MD on 8/30/2021, the patient was examined and no significant changes have occurred in the patient's condition since the H&P was performed.   I dis

## 2021-08-30 NOTE — ANESTHESIA POSTPROCEDURE EVALUATION
Altaf Patient Status:  Outpatient in a Bed   Age/Gender 73year old female MRN [id number]   [facility] SURGERY Attending Saqib Sosa, [address] [facility] [address] Day # 0 PCP Jeimy Ortiz MD       Anesthesia Post-op Note

## 2021-08-30 NOTE — PLAN OF CARE
Pt received from PACU via bed. Pt alert and oriented. Pt denies nausea. Pt states has pain with swallowing and ice bag applied to anterior neck and cold water helping. Anterior neck incision CDI with dermabond; no swelling or hematoma noted.  HOB elevated 3

## 2021-08-30 NOTE — ANESTHESIA PROCEDURE NOTES
Airway  Date/Time: 8/30/2021 11:28 AM  Urgency: Elective    Airway not difficult    General Information and Staff    Patient location during procedure: OR  Anesthesiologist: Humaira Jackson MD  Resident/CRNA: Peggy Rivera CRNA  Performed: MARIOLA June

## 2021-08-30 NOTE — ANESTHESIA PREPROCEDURE EVALUATION
PRE-OP EVALUATION    Patient Name: Hoang Dukes    Admit Diagnosis: Thyroid neoplasm [D49.7]    Pre-op Diagnosis: Thyroid neoplasm [D49.7]    BILATERAL TOTAL THYROIDECTOMY    Anesthesia Procedure: BILATERAL TOTAL THYROIDECTOMY (Bilateral )    Noni Hercules Procedure Laterality Date   • BACK SURGERY  2006    s/p L3-5 laminectomy   • COLONOSCOPY  2007, int hem   • COLONOSCOPY  8/7/20= Diverticulosis, Hemorrhoids    Repeat PRN   • COLONOSCOPY N/A 8/7/2020    Procedure: COLONOSCOPY, POSSIBLE BIOPSY, POSSIBLE P allergy, nausea, vomiting, dental trauma, pain management modalities were all discussed. Particularly the risk of dental trauma with weakened teeth or crowns, partials, fillings and any non natural teeth due to instrumentation of oral cavity and airway.  Pa

## 2021-08-31 VITALS
HEIGHT: 67.75 IN | RESPIRATION RATE: 18 BRPM | BODY MASS INDEX: 29.17 KG/M2 | DIASTOLIC BLOOD PRESSURE: 65 MMHG | SYSTOLIC BLOOD PRESSURE: 147 MMHG | WEIGHT: 190.25 LBS | OXYGEN SATURATION: 96 % | HEART RATE: 59 BPM | TEMPERATURE: 98 F

## 2021-08-31 LAB — CALCIUM BLD-MCNC: 9.5 MG/DL (ref 8.5–10.1)

## 2021-08-31 PROCEDURE — 82310 ASSAY OF CALCIUM: CPT | Performed by: OTOLARYNGOLOGY

## 2021-08-31 RX ORDER — CALCIUM CARBONATE 200(500)MG
1 TABLET,CHEWABLE ORAL 3 TIMES DAILY
Qty: 100 TABLET | Refills: 0 | Status: SHIPPED | OUTPATIENT
Start: 2021-08-31 | End: 2021-11-17

## 2021-08-31 NOTE — OPERATIVE REPORT
PATIENT NAME: Yamileth Lopez   MRN: [id number]   DATE OF OPERATION: 8/30/2021     PREOPERATIVE DIAGNOSIS:    1. Thyroid nodule    POSTOPERATIVE DIAGNOSIS:   1. Thyroid nodule    PROCEDURE:    1. Total thyroidectomy   2.  Nerve monitoring    SURGEON: Diego Bhandari the median raphe superiorly and inferiorly. The right strap muscles were then retracted with an [employer] and a mosquito was used to create a plane between the muscle and gland.  Once the straps were lateralized with an [employer] retractor, the superior matty and confirmed with nerve stimulation. Care was taken to dissect the nerve away from the gland. Once the nerve was isolated, the inferior pole was released with Harmonic scalpel. The trachea was identified and Berry’s ligament was then transected.  The thyro

## 2021-08-31 NOTE — PLAN OF CARE
Pt alert and oriented x 4. Up ad carlton. Lung sounds clear on room air, . Pt denies SOB. Voiding well, in good amounts. Anterior neck incision with skin glue, c/d/I. Ice pack prn. PO norco given for pain, see mar. Tolerating full liquids, pt denies nausea. medications as ordered and appropriate  - Administer supportive blood products/factors as ordered and appropriate  Outcome: Progressing     Problem: NEUROLOGICAL - ADULT  Goal: Achieves stable or improved neurological status  Description: INTERVENTIONS  - Encourage toileting schedule  Outcome: Progressing

## 2021-08-31 NOTE — PLAN OF CARE
Pt alert and oriented x4. Resting in bed. Anterior neck incision ISRAEL, no bruising/swelling. IV SL. Tolerating soft diet. RA. Up ad carlton. PRN pain medication per mar for throat pain. Pt denies SOB, chest pain, n/v. Voiding without difficulty.  Pt denies numbn wounds(s) or drain site(s) healing without S/S of infection  Description: INTERVENTIONS:  - Assess and document risk factors for pressure ulcer development  - Assess and document skin integrity  - Assess and document dressing/incision, wound bed, drain sit unsuccessful or patient reports new pain  - Anticipate increased pain with activity and pre-medicate as appropriate  Outcome: Progressing     Problem: SAFETY ADULT - FALL  Goal: Free from fall injury  Description: INTERVENTIONS:  - Assess pt frequently for

## 2021-08-31 NOTE — PROGRESS NOTES
NURSING DISCHARGE NOTE    Discharged Home via Wheelchair. Accompanied by Family member  Belongings Taken by patient/family. Prescriptions e-prescribed to pharmacy. Medication regimen discussed. Post op instructions discussed.  Follow up appointments

## 2021-08-31 NOTE — PLAN OF CARE
Problem: Patient/Family Goals  Goal: Patient/Family Long Term Goal  Description: Patient's Long Term Goal: discharge home    Interventions:  - pain medication prn  - tolerate diet  - See additional Care Plan goals for specific interventions  8/31/2021 11 factors for pressure ulcer development  - Assess and document skin integrity  - Assess and document dressing/incision, wound bed, drain sites and surrounding tissue  - Implement wound care per orders  - Initiate isolation precautions as appropriate  - Init and pain management  - Manage/alleviate anxiety  - Utilize distraction and/or relaxation techniques  - Monitor for opioid side effects  - Notify MD/LIP if interventions unsuccessful or patient reports new pain  - Anticipate increased pain with activity and

## 2021-11-16 PROBLEM — E89.0 POSTOPERATIVE HYPOTHYROIDISM: Status: ACTIVE | Noted: 2021-11-16

## 2022-01-17 ENCOUNTER — LAB ENCOUNTER (OUTPATIENT)
Dept: LAB | Age: 72
End: 2022-01-17
Attending: ORTHOPAEDIC SURGERY
Payer: COMMERCIAL

## 2022-01-17 DIAGNOSIS — M16.11 PRIMARY OSTEOARTHRITIS OF RIGHT HIP: ICD-10-CM

## 2022-01-17 PROCEDURE — 87081 CULTURE SCREEN ONLY: CPT

## 2022-01-28 ENCOUNTER — LAB ENCOUNTER (OUTPATIENT)
Dept: LAB | Age: 72
End: 2022-01-28
Attending: ORTHOPAEDIC SURGERY
Payer: COMMERCIAL

## 2022-01-28 ENCOUNTER — LABORATORY ENCOUNTER (OUTPATIENT)
Dept: LAB | Age: 72
End: 2022-01-28
Attending: ORTHOPAEDIC SURGERY
Payer: COMMERCIAL

## 2022-01-28 DIAGNOSIS — M16.11 PRIMARY OSTEOARTHRITIS OF RIGHT HIP: ICD-10-CM

## 2022-01-28 LAB
ANTIBODY SCREEN: NEGATIVE
RH BLOOD TYPE: POSITIVE

## 2022-01-28 PROCEDURE — 86901 BLOOD TYPING SEROLOGIC RH(D): CPT

## 2022-01-28 PROCEDURE — 86850 RBC ANTIBODY SCREEN: CPT

## 2022-01-28 PROCEDURE — 86900 BLOOD TYPING SEROLOGIC ABO: CPT

## 2022-01-29 LAB — SARS-COV-2 RNA RESP QL NAA+PROBE: NOT DETECTED

## 2022-01-31 ENCOUNTER — ANESTHESIA (OUTPATIENT)
Dept: SURGERY | Facility: HOSPITAL | Age: 72
End: 2022-01-31
Payer: COMMERCIAL

## 2022-01-31 ENCOUNTER — ANESTHESIA EVENT (OUTPATIENT)
Dept: SURGERY | Facility: HOSPITAL | Age: 72
End: 2022-01-31
Payer: COMMERCIAL

## 2022-01-31 ENCOUNTER — APPOINTMENT (OUTPATIENT)
Dept: GENERAL RADIOLOGY | Facility: HOSPITAL | Age: 72
End: 2022-01-31
Attending: ORTHOPAEDIC SURGERY
Payer: COMMERCIAL

## 2022-01-31 ENCOUNTER — HOSPITAL ENCOUNTER (OUTPATIENT)
Facility: HOSPITAL | Age: 72
Setting detail: HOSPITAL OUTPATIENT SURGERY
Discharge: HOME | End: 2022-01-31
Attending: ORTHOPAEDIC SURGERY | Admitting: ORTHOPAEDIC SURGERY
Payer: COMMERCIAL

## 2022-01-31 VITALS
HEART RATE: 56 BPM | RESPIRATION RATE: 16 BRPM | SYSTOLIC BLOOD PRESSURE: 123 MMHG | TEMPERATURE: 99 F | DIASTOLIC BLOOD PRESSURE: 62 MMHG | WEIGHT: 196.13 LBS | HEIGHT: 67.75 IN | OXYGEN SATURATION: 98 % | BODY MASS INDEX: 30.07 KG/M2

## 2022-01-31 DIAGNOSIS — M16.11 PRIMARY OSTEOARTHRITIS OF RIGHT HIP: Primary | ICD-10-CM

## 2022-01-31 PROCEDURE — 88311 DECALCIFY TISSUE: CPT | Performed by: ORTHOPAEDIC SURGERY

## 2022-01-31 PROCEDURE — 97161 PT EVAL LOW COMPLEX 20 MIN: CPT

## 2022-01-31 PROCEDURE — 88304 TISSUE EXAM BY PATHOLOGIST: CPT | Performed by: ORTHOPAEDIC SURGERY

## 2022-01-31 PROCEDURE — 76000 FLUOROSCOPY <1 HR PHYS/QHP: CPT | Performed by: ORTHOPAEDIC SURGERY

## 2022-01-31 PROCEDURE — 0SR904Z REPLACEMENT OF RIGHT HIP JOINT WITH CERAMIC ON POLYETHYLENE SYNTHETIC SUBSTITUTE, OPEN APPROACH: ICD-10-PCS | Performed by: ORTHOPAEDIC SURGERY

## 2022-01-31 PROCEDURE — 97530 THERAPEUTIC ACTIVITIES: CPT

## 2022-01-31 PROCEDURE — 73501 X-RAY EXAM HIP UNI 1 VIEW: CPT | Performed by: ORTHOPAEDIC SURGERY

## 2022-01-31 DEVICE — IMPLANTABLE DEVICE
Type: IMPLANTABLE DEVICE | Site: HIP | Status: FUNCTIONAL
Brand: G7® DUAL MOBILITY ACETABULAR SYSTEM

## 2022-01-31 DEVICE — IMPLANTABLE DEVICE
Type: IMPLANTABLE DEVICE | Site: HIP | Status: FUNCTIONAL
Brand: VIVACIT-E®

## 2022-01-31 DEVICE — BIOLOX® DELTA HEAD, 12/14, 28 X -3.5
Type: IMPLANTABLE DEVICE | Site: HIP | Status: FUNCTIONAL
Brand: BIOLOX® DELTA

## 2022-01-31 DEVICE — IMPLANTABLE DEVICE
Type: IMPLANTABLE DEVICE | Site: HIP | Status: FUNCTIONAL
Brand: G7® ACETABULAR SYSTEM

## 2022-01-31 RX ORDER — DIPHENHYDRAMINE HYDROCHLORIDE 50 MG/ML
25 INJECTION INTRAMUSCULAR; INTRAVENOUS ONCE AS NEEDED
Status: DISCONTINUED | OUTPATIENT
Start: 2022-01-31 | End: 2022-01-31

## 2022-01-31 RX ORDER — METOCLOPRAMIDE HYDROCHLORIDE 5 MG/ML
10 INJECTION INTRAMUSCULAR; INTRAVENOUS AS NEEDED
Status: DISCONTINUED | OUTPATIENT
Start: 2022-01-31 | End: 2022-01-31

## 2022-01-31 RX ORDER — SENNOSIDES 8.6 MG
17.2 TABLET ORAL NIGHTLY
Status: CANCELLED | OUTPATIENT
Start: 2022-01-31

## 2022-01-31 RX ORDER — OXYCODONE HYDROCHLORIDE 5 MG/1
5 TABLET ORAL EVERY 4 HOURS PRN
Status: DISCONTINUED | OUTPATIENT
Start: 2022-01-31 | End: 2022-01-31

## 2022-01-31 RX ORDER — DEXAMETHASONE SODIUM PHOSPHATE 4 MG/ML
4 VIAL (ML) INJECTION AS NEEDED
Status: DISCONTINUED | OUTPATIENT
Start: 2022-01-31 | End: 2022-01-31

## 2022-01-31 RX ORDER — MIDAZOLAM HYDROCHLORIDE 1 MG/ML
1 INJECTION INTRAMUSCULAR; INTRAVENOUS EVERY 5 MIN PRN
Status: DISCONTINUED | OUTPATIENT
Start: 2022-01-31 | End: 2022-01-31

## 2022-01-31 RX ORDER — ACETAMINOPHEN 325 MG/1
TABLET ORAL
Status: COMPLETED
Start: 2022-01-31 | End: 2022-01-31

## 2022-01-31 RX ORDER — MEPERIDINE HYDROCHLORIDE 25 MG/ML
12.5 INJECTION INTRAMUSCULAR; INTRAVENOUS; SUBCUTANEOUS AS NEEDED
Status: DISCONTINUED | OUTPATIENT
Start: 2022-01-31 | End: 2022-01-31

## 2022-01-31 RX ORDER — CEFAZOLIN SODIUM/WATER 2 G/20 ML
2 SYRINGE (ML) INTRAVENOUS EVERY 8 HOURS
Status: CANCELLED | OUTPATIENT
Start: 2022-01-31 | End: 2022-02-01

## 2022-01-31 RX ORDER — TRANEXAMIC ACID 10 MG/ML
1000 INJECTION, SOLUTION INTRAVENOUS ONCE
Status: COMPLETED | OUTPATIENT
Start: 2022-01-31 | End: 2022-01-31

## 2022-01-31 RX ORDER — LIDOCAINE HYDROCHLORIDE 10 MG/ML
INJECTION, SOLUTION EPIDURAL; INFILTRATION; INTRACAUDAL; PERINEURAL AS NEEDED
Status: DISCONTINUED | OUTPATIENT
Start: 2022-01-31 | End: 2022-01-31 | Stop reason: SURG

## 2022-01-31 RX ORDER — BISACODYL 10 MG
10 SUPPOSITORY, RECTAL RECTAL
Status: CANCELLED | OUTPATIENT
Start: 2022-01-31

## 2022-01-31 RX ORDER — ONDANSETRON 2 MG/ML
4 INJECTION INTRAMUSCULAR; INTRAVENOUS EVERY 4 HOURS PRN
Status: DISCONTINUED | OUTPATIENT
Start: 2022-01-31 | End: 2022-01-31

## 2022-01-31 RX ORDER — DOCUSATE SODIUM 100 MG/1
100 CAPSULE, LIQUID FILLED ORAL 2 TIMES DAILY
Status: CANCELLED | OUTPATIENT
Start: 2022-01-31

## 2022-01-31 RX ORDER — OXYCODONE HYDROCHLORIDE 5 MG/1
10 TABLET ORAL EVERY 4 HOURS PRN
Status: DISCONTINUED | OUTPATIENT
Start: 2022-01-31 | End: 2022-01-31

## 2022-01-31 RX ORDER — DIPHENHYDRAMINE HYDROCHLORIDE 50 MG/ML
12.5 INJECTION INTRAMUSCULAR; INTRAVENOUS AS NEEDED
Status: DISCONTINUED | OUTPATIENT
Start: 2022-01-31 | End: 2022-01-31

## 2022-01-31 RX ORDER — ASPIRIN 81 MG/1
81 TABLET ORAL 2 TIMES DAILY
Status: CANCELLED | OUTPATIENT
Start: 2022-01-31

## 2022-01-31 RX ORDER — OXYCODONE HYDROCHLORIDE 5 MG/1
2.5 TABLET ORAL EVERY 4 HOURS PRN
Status: DISCONTINUED | OUTPATIENT
Start: 2022-01-31 | End: 2022-01-31

## 2022-01-31 RX ORDER — NALOXONE HYDROCHLORIDE 0.4 MG/ML
80 INJECTION, SOLUTION INTRAMUSCULAR; INTRAVENOUS; SUBCUTANEOUS AS NEEDED
Status: DISCONTINUED | OUTPATIENT
Start: 2022-01-31 | End: 2022-01-31

## 2022-01-31 RX ORDER — ACETAMINOPHEN 500 MG
1000 TABLET ORAL ONCE
Status: DISCONTINUED | OUTPATIENT
Start: 2022-01-31 | End: 2022-01-31 | Stop reason: HOSPADM

## 2022-01-31 RX ORDER — SODIUM CHLORIDE, SODIUM LACTATE, POTASSIUM CHLORIDE, CALCIUM CHLORIDE 600; 310; 30; 20 MG/100ML; MG/100ML; MG/100ML; MG/100ML
INJECTION, SOLUTION INTRAVENOUS CONTINUOUS
Status: DISCONTINUED | OUTPATIENT
Start: 2022-01-31 | End: 2022-01-31

## 2022-01-31 RX ORDER — SODIUM CHLORIDE 9 MG/ML
INJECTION, SOLUTION INTRAVENOUS CONTINUOUS
Status: CANCELLED | OUTPATIENT
Start: 2022-01-31

## 2022-01-31 RX ORDER — CEFAZOLIN SODIUM/WATER 2 G/20 ML
2 SYRINGE (ML) INTRAVENOUS ONCE
Status: COMPLETED | OUTPATIENT
Start: 2022-01-31 | End: 2022-01-31

## 2022-01-31 RX ORDER — PROCHLORPERAZINE EDISYLATE 5 MG/ML
10 INJECTION INTRAMUSCULAR; INTRAVENOUS EVERY 6 HOURS PRN
Status: CANCELLED | OUTPATIENT
Start: 2022-01-31 | End: 2022-02-02

## 2022-01-31 RX ORDER — ONDANSETRON 2 MG/ML
4 INJECTION INTRAMUSCULAR; INTRAVENOUS AS NEEDED
Status: DISCONTINUED | OUTPATIENT
Start: 2022-01-31 | End: 2022-01-31

## 2022-01-31 RX ORDER — ACETAMINOPHEN 325 MG/1
650 TABLET ORAL ONCE
Status: COMPLETED | OUTPATIENT
Start: 2022-01-31 | End: 2022-01-31

## 2022-01-31 RX ORDER — POLYETHYLENE GLYCOL 3350 17 G/17G
17 POWDER, FOR SOLUTION ORAL DAILY PRN
Status: CANCELLED | OUTPATIENT
Start: 2022-01-31

## 2022-01-31 RX ORDER — SODIUM PHOSPHATE, DIBASIC AND SODIUM PHOSPHATE, MONOBASIC 7; 19 G/133ML; G/133ML
1 ENEMA RECTAL ONCE AS NEEDED
Status: CANCELLED | OUTPATIENT
Start: 2022-01-31

## 2022-01-31 RX ORDER — HYDROMORPHONE HYDROCHLORIDE 1 MG/ML
0.4 INJECTION, SOLUTION INTRAMUSCULAR; INTRAVENOUS; SUBCUTANEOUS EVERY 5 MIN PRN
Status: DISCONTINUED | OUTPATIENT
Start: 2022-01-31 | End: 2022-01-31

## 2022-01-31 RX ORDER — MIDAZOLAM HYDROCHLORIDE 1 MG/ML
INJECTION INTRAMUSCULAR; INTRAVENOUS AS NEEDED
Status: DISCONTINUED | OUTPATIENT
Start: 2022-01-31 | End: 2022-01-31 | Stop reason: SURG

## 2022-01-31 RX ADMIN — LIDOCAINE HYDROCHLORIDE 50 MG: 10 INJECTION, SOLUTION EPIDURAL; INFILTRATION; INTRACAUDAL; PERINEURAL at 07:24:00

## 2022-01-31 RX ADMIN — MIDAZOLAM HYDROCHLORIDE 2 MG: 1 INJECTION INTRAMUSCULAR; INTRAVENOUS at 07:11:00

## 2022-01-31 RX ADMIN — CEFAZOLIN SODIUM/WATER 2 G: 2 G/20 ML SYRINGE (ML) INTRAVENOUS at 07:28:00

## 2022-01-31 RX ADMIN — TRANEXAMIC ACID 1000 MG: 10 INJECTION, SOLUTION INTRAVENOUS at 07:31:00

## 2022-01-31 RX ADMIN — SODIUM CHLORIDE, SODIUM LACTATE, POTASSIUM CHLORIDE, CALCIUM CHLORIDE: 600; 310; 30; 20 INJECTION, SOLUTION INTRAVENOUS at 08:32:00

## 2022-01-31 NOTE — ANESTHESIA PROCEDURE NOTES
Spinal Block  Performed by: Cris Yang MD  Authorized by: Cris Yang MD       General Information and Staff    Start Time:  1/31/2022 7:08 AM  End Time:  1/31/2022 7:20 AM  Anesthesiologist:  Cris Yang MD  Performed by:   Anesthesio

## 2022-01-31 NOTE — ANESTHESIA POSTPROCEDURE EVALUATION
[facility] Patient Status:  Outpatient in a Bed   Age/Gender 73year old female MRN [id number]   Location [address] [facility] Attending Tim Anglin MD   Hosp Day # 0 PCP Omar Lowe MD       Ramirez Denis

## 2022-01-31 NOTE — PROGRESS NOTES
at bedside. Reviewed indications, side effects of pain medication/narcotics and constipation prevention. Stressed importance of increased fluids/roughage in diet, continued use of stool softeners along with laxatives and suppositories as needed while taking narcotics even when at home. Pt verbalized understanding. Teachback done on ankle pumps and incentive spirometry use 10 times every hour w/a for each. Reviewed spandagrip, dressing changes, showering, any restrictions and cold therapy. Reviewed discharge plan with patient. Solicited and answered any questions regarding care.

## 2022-01-31 NOTE — ANESTHESIA PREPROCEDURE EVALUATION
PRE-OP EVALUATION    Patient Name: Sarina Chung    Admit Diagnosis: Primary osteoarthritis of right hip [M16.11]    Pre-op Diagnosis: Primary osteoarthritis of right hip [M16.11]    RIGHT ANTERIOR TOTAL HIP ARTHROPLASTY    Anesthesia Procedure: [facility]. To take for 6 weeks total to prevent blood clots. , Disp: 84 tablet, Rfl: 0, post op  celecoxib (CELEBREX) 200 MG Oral Cap, Take 1 capsule (200 mg total) by mouth daily. , Disp: 30 capsule, Rfl: 0, post op  pregabalin (LYRICA) 75 MG Oral Cap, Take 1 cap COLPOSCOPY,BX CERVIX/ENDOCERV CURR     • OTHER  2009    transforaminal lumbar interbody fusion   • OTHER  2014    left foot surgery   • OTHER      cervical cryosurgery   • THYROIDECTOMY  08/30/2021   • TOTAL HIP REPLACEMENT Left      Social History    [place]

## 2022-01-31 NOTE — INTERVAL H&P NOTE
Pre-op Diagnosis: Primary osteoarthritis of right hip [M16.11]    The above referenced H&P was reviewed by Renita Manuel MD on 1/31/2022, the patient was examined and no significant changes have occurred in the patient's condition since the H&P was performed. I discussed with the patient and/or legal representative the potential benefits, risks and side effects of this procedure; the likelihood of the patient achieving goals; and potential problems that might occur during recuperation. I discussed reasonable alternatives to the procedure, including risks, benefits and side effects related to the alternatives and risks related to not receiving this procedure. We will proceed with procedure as planned.

## 2022-01-31 NOTE — OPERATIVE REPORT
OPERATIVE REPORT    Facility:  [facility]    Patient Name:  Justin Esteves    Age/Gender:  73year old female  :  1950    MRN:  [id number]    Date of Operation:  2022    Preoperative Diagnosis:  right Hip osteoarthritis    Postoperative Diagnosis:  Same    Procedure Performed: Complex right TOTAL HIP ARTHROPLASTY    Surgeon:  Peng Donnelly Arm, M.D. Assistant:  PHILIPP Murillo       Anesthesia:  epidural    Estimated Blood Loss:  100 cc    Drain:  none    Complications:  NONE    Implants:   1. [employer]ette [employer] G7 acetabular shell, size 54 mm   2. Neutral Dual Mobility liner, size 54x44 mm   3. Josette Avenir Complete femoral stem, size 3, high offset   4.  44 mm Bearing, 28 mm head    5. 28 mm, -3.5 mm delta ceramic femoral head      Indications for Procedure:  Justin Esteves is a 73year old female with severe right hip osteoarthritis. After a prolonged attempt at conservative management, the patient requested a total hip arthroplasty. The spectrum of treatment options were discussed with the patient in detail including both the nonoperative and operative treatment modalities and their respective risks and benefits. After thorough discussion, the patient has elected to undergo surgical treatment. The details of the surgical procedure were explained including the location of probable incisions and a description of the likely implants to be used. Models and diagrams were used as educational resources. The patient understands the likely convalescence after surgery, as well as the rehabilitation required. We thoroughly discussed the risks, benefits, and alternatives to surgery.   The risks include but are not limited to the risk of infection, joint stiffness, blood clots (including DVT and/or pulmonary embolus along with the risk of death), neurologic and/or vascular injury, fracture, dislocation, nonunion, malunion, need for further surgery including hardware failure requiring revision, and continued pain. Leg length discrepancy and dislocation risks were also discussed at length. It was explained that if tissue has been repaired or reconstructed, there is also a chance of failure which may require further management. In addition, we have discussed the risks, including the risk of disease transmission, associated with any allograft tissue which may be utilized. The patient has a very complex hip pathology including significant lumbosacral fusion. Due to this complex situation, a dual mobility construct was recommended. Following the completion of the discussion, the patient expressed understanding of this planned course of care, all their questions were answered and consent will be obtained preoperatively. Description of Procedure: The patient was taken to the operating room after the correct surgical site was marked in the preop holding area. The patient was placed under anesthesia and placed in a supine position on the [facility] orthopaedic table. Preoperative antibiotics were given. All bony prominences were padded. The right hip was prepped and draped in usual, sterile surgical fashion. A surgical timeout was held verifying the correct surgical site and procedure while ensuring antibiotics had been administered. The patient has a very complex hip pathology including right significant lumbosacral fusion. Due to this complex situation, a dual mobility construct was recommended. A much more complex and time consuming procedure was required. Bony landmarks were palpated for incision and a direct anterior approach was performed to the hip between the interval of tensor fascia nicky and sartorius/rectus femoris. Lateral femoral circumflex vessels were identified, isolated and cauterized. An L-shaped capsulotomy was performed and the capsule was tagged for retraction.   Retractors were placed inside the hip capsule and further capsular release was performed inside the saddle of the femur as well as down the medial aspect of the femoral neck. Osteotomy of the femoral neck was performed using a sagittal saw. A corkscrew was used to remove the femoral head. Retractors were placed anterior and posterior to the acetabulum. The hip labrum as well as the soft tissue in the cotyloid fossa were removed in their entirety. Reaming was then performed using fluoroscopy to assess depth, anteversion and abduction angle. The acetabular shell was then inserted again using fluoroscopy to assess abduction angle, anteversion and complete seating of the acetabular component. A neutral dual mobility liner was then inserted and impacted without difficulty. Attention was then turned to the femur where the femoral hook was placed around the femur just distal to the vastus tubercle and proximal to the tendon of the gluteus elizabet. The leg was then externally rotated, extended and adducted with traction completely released. The mechanical lift arm on the table was used to hold the proximal femur carefully. Capsule was then further released inside the trochanter until the entire inside of the greater trochanter was easily visualized. The lateral femoral neck remnant was removed and the femur was broached. After broaching up to a proper size, trial head and neck components were used to determine stem offset and head length. Fluoroscopy was used to verify the position of the broach as well as the length and offset to determine final implant positions. The trials were removed and the stem. The head was impacted onto the stem. There were no complications with insertion of the femoral components. The hip was then reduced under direct visualization. Fluoroscopy again verified length, offset, implant position and stem fill of the femoral canal.  Fluoroscopy also verified there were no iatrogenic fractures.   The wound was copiously irrigated and the tag sutures in the capsule were tied together closing the anterior hip capsule. Local anesthetic and pain medicine was injected into the hip capsule and surrounding soft tissues. The fascia nicky was closed with absorbable suture. Skin was then closed in 2 layers with absorbable suture. Dermabond was applied to the wound and allowed to dry before sterile dressings were applied. There were no complications and the procedure went as planned. The patient left the operating room in stable condition. A surgical first assistant was required and necessary for the completion of this case to aid in manipulation and positioning of the extremity while the surgeon operated. Their assistance was vital to the safety and success of the procedure. DVT prophylaxis will consist of ASA according to Chest and AAOS guidelines due to bleeding concerns. Maria Elena Vaz M.D.

## 2022-01-31 NOTE — CM/SW NOTE
01/31/22 1100   CM/SW Referral Data   Referral Source Nurse   Reason for Referral Discharge planning   Informant EMR;Clinical Staff Member   Discharge Needs   Anticipated D/C needs Home health care       Received call from pt's RN in Same Day Surgery who stated plan for patient to discharge home today from [facility]. Pt is a 72 y/o woman s/p UZMA with Dr Mary Sanches. Pt with pre-operative plan for [facility] [place]. PT eval pending. Updated Stalin Murphy with [facility]. Await therapy recommendations for further DC planning. / to remain available for support and/or discharge planning. Tessa Gonzalez [facility]  [facility]anner  [phone]    Addendum:  Updated by RN that PT is recommending [address]. Received confirmation from Stalin Murphy with [facility] that pt is accepted for [address] services. No further needs at this time. / to remain available for support and/or discharge planning.

## 2022-02-07 PROBLEM — Z96.641 STATUS POST TOTAL HIP REPLACEMENT, RIGHT: Status: ACTIVE | Noted: 2022-02-07

## 2023-04-11 ENCOUNTER — APPOINTMENT (OUTPATIENT)
Dept: GENERAL RADIOLOGY | Facility: HOSPITAL | Age: 73
End: 2023-04-11
Attending: EMERGENCY MEDICINE
Payer: COMMERCIAL

## 2023-04-11 ENCOUNTER — HOSPITAL ENCOUNTER (INPATIENT)
Facility: HOSPITAL | Age: 73
LOS: 3 days | Discharge: HOME | End: 2023-04-14
Attending: EMERGENCY MEDICINE | Admitting: INTERNAL MEDICINE
Payer: COMMERCIAL

## 2023-04-11 DIAGNOSIS — J18.9 COMMUNITY ACQUIRED PNEUMONIA OF LEFT LOWER LOBE OF LUNG: Primary | ICD-10-CM

## 2023-04-11 DIAGNOSIS — R19.7 DIARRHEA OF PRESUMED INFECTIOUS ORIGIN: ICD-10-CM

## 2023-04-11 DIAGNOSIS — E87.20 METABOLIC ACIDOSIS: ICD-10-CM

## 2023-04-11 DIAGNOSIS — E86.0 DEHYDRATION: ICD-10-CM

## 2023-04-11 LAB
ADENOVIRUS F 40/41 PCR: NEGATIVE
ALBUMIN SERPL-MCNC: 3.2 G/DL (ref 3.4–5)
ALBUMIN/GLOB SERPL: 0.9 {RATIO} (ref 1–2)
ALP LIVER SERPL-CCNC: 47 U/L
ALT SERPL-CCNC: 18 U/L
ANION GAP SERPL CALC-SCNC: 10 MMOL/L (ref 0–18)
AST SERPL-CCNC: 11 U/L (ref 15–37)
ASTROVIRUS PCR: NEGATIVE
ATRIAL RATE: 101 BPM
BASOPHILS # BLD AUTO: 0.04 X10(3) UL (ref 0–0.2)
BASOPHILS NFR BLD AUTO: 0.7 %
BILIRUB SERPL-MCNC: 0.6 MG/DL (ref 0.1–2)
BILIRUB UR QL STRIP.AUTO: NEGATIVE
BUN BLD-MCNC: 13 MG/DL (ref 7–18)
C CAYETANENSIS DNA SPEC QL NAA+PROBE: NEGATIVE
C DIFF TOX B STL QL: POSITIVE
CALCIUM BLD-MCNC: 8.5 MG/DL (ref 8.5–10.1)
CAMPY SP DNA.DIARRHEA STL QL NAA+PROBE: NEGATIVE
CHLORIDE SERPL-SCNC: 105 MMOL/L (ref 98–112)
CLARITY UR REFRACT.AUTO: CLEAR
CO2 SERPL-SCNC: 20 MMOL/L (ref 21–32)
COLOR UR AUTO: YELLOW
CREAT BLD-MCNC: 1.13 MG/DL
CRYPTOSP DNA SPEC QL NAA+PROBE: NEGATIVE
EAEC PAA PLAS AGGR+AATA ST NAA+NON-PRB: NEGATIVE
EC STX1+STX2 + H7 FLIC SPEC NAA+PROBE: NEGATIVE
ENTAMOEBA HISTOLYTICA PCR: NEGATIVE
EOSINOPHIL # BLD AUTO: 0 X10(3) UL (ref 0–0.7)
EOSINOPHIL NFR BLD AUTO: 0 %
EPEC EAE GENE STL QL NAA+NON-PROBE: NEGATIVE
ERYTHROCYTE [DISTWIDTH] IN BLOOD BY AUTOMATED COUNT: 12.2 %
ETEC LTA+ST1A+ST1B TOX ST NAA+NON-PROBE: NEGATIVE
FLUAV + FLUBV RNA SPEC NAA+PROBE: NEGATIVE
FLUAV + FLUBV RNA SPEC NAA+PROBE: NEGATIVE
GFR SERPLBLD BASED ON 1.73 SQ M-ARVRAT: 52 ML/MIN/1.73M2 (ref 60–?)
GIARDIA LAMBLIA PCR: NEGATIVE
GLOBULIN PLAS-MCNC: 3.6 G/DL (ref 2.8–4.4)
GLUCOSE BLD-MCNC: 156 MG/DL (ref 70–99)
GLUCOSE UR STRIP.AUTO-MCNC: NEGATIVE MG/DL
HCT VFR BLD AUTO: 40.2 %
HGB BLD-MCNC: 13.4 G/DL
IMM GRANULOCYTES # BLD AUTO: 0.08 X10(3) UL (ref 0–1)
IMM GRANULOCYTES NFR BLD: 1.4 %
KETONES UR STRIP.AUTO-MCNC: 20 MG/DL
LACTATE SERPL-SCNC: 1.2 MMOL/L (ref 0.4–2)
LEUKOCYTE ESTERASE UR QL STRIP.AUTO: NEGATIVE
LYMPHOCYTES # BLD AUTO: 0.88 X10(3) UL (ref 1–4)
LYMPHOCYTES NFR BLD AUTO: 15 %
MCH RBC QN AUTO: 29.3 PG (ref 26–34)
MCHC RBC AUTO-ENTMCNC: 33.3 G/DL (ref 31–37)
MCV RBC AUTO: 87.8 FL
MONOCYTES # BLD AUTO: 1.15 X10(3) UL (ref 0.1–1)
MONOCYTES NFR BLD AUTO: 19.6 %
NEUTROPHILS # BLD AUTO: 3.72 X10 (3) UL (ref 1.5–7.7)
NEUTROPHILS # BLD AUTO: 3.72 X10(3) UL (ref 1.5–7.7)
NEUTROPHILS NFR BLD AUTO: 63.3 %
NITRITE UR QL STRIP.AUTO: NEGATIVE
NOROVIRUS GI/GII PCR: NEGATIVE
OSMOLALITY SERPL CALC.SUM OF ELEC: 283 MOSM/KG (ref 275–295)
P AXIS: 67 DEGREES
P SHIGELLOIDES DNA STL QL NAA+PROBE: NEGATIVE
P-R INTERVAL: 152 MS
PH UR STRIP.AUTO: 5 [PH] (ref 5–8)
PLATELET # BLD AUTO: 201 10(3)UL (ref 150–450)
POTASSIUM SERPL-SCNC: 3.2 MMOL/L (ref 3.5–5.1)
PROCALCITONIN SERPL-MCNC: 7.13 NG/ML (ref ?–0.16)
PROT SERPL-MCNC: 6.8 G/DL (ref 6.4–8.2)
PROT UR STRIP.AUTO-MCNC: NEGATIVE MG/DL
Q-T INTERVAL: 326 MS
QRS DURATION: 80 MS
QTC CALCULATION (BEZET): 422 MS
R AXIS: 3 DEGREES
RBC # BLD AUTO: 4.58 X10(6)UL
RBC UR QL AUTO: NEGATIVE
ROTAVIRUS A PCR: NEGATIVE
RSV RNA SPEC NAA+PROBE: NEGATIVE
SALMONELLA DNA SPEC QL NAA+PROBE: NEGATIVE
SAPOVIRUS PCR: NEGATIVE
SARS-COV-2 RNA RESP QL NAA+PROBE: NOT DETECTED
SHIGELLA SP+EIEC IPAH ST NAA+NON-PROBE: NEGATIVE
SODIUM SERPL-SCNC: 135 MMOL/L (ref 136–145)
SP GR UR STRIP.AUTO: 1.01 (ref 1–1.03)
T AXIS: 178 DEGREES
TROPONIN I HIGH SENSITIVITY: 10 NG/L
TSI SER-ACNC: 1.48 MIU/ML (ref 0.36–3.74)
UROBILINOGEN UR STRIP.AUTO-MCNC: <2 MG/DL
V CHOLERAE DNA SPEC QL NAA+PROBE: NEGATIVE
VENTRICULAR RATE: 101 BPM
VIBRIO DNA SPEC NAA+PROBE: NEGATIVE
WBC # BLD AUTO: 5.9 X10(3) UL (ref 4–11)
YERSINIA DNA SPEC NAA+PROBE: NEGATIVE

## 2023-04-11 PROCEDURE — 84145 PROCALCITONIN (PCT): CPT | Performed by: EMERGENCY MEDICINE

## 2023-04-11 PROCEDURE — 87507 IADNA-DNA/RNA PROBE TQ 12-25: CPT | Performed by: EMERGENCY MEDICINE

## 2023-04-11 PROCEDURE — 87493 C DIFF AMPLIFIED PROBE: CPT | Performed by: EMERGENCY MEDICINE

## 2023-04-11 PROCEDURE — 71045 X-RAY EXAM CHEST 1 VIEW: CPT | Performed by: EMERGENCY MEDICINE

## 2023-04-11 PROCEDURE — 84484 ASSAY OF TROPONIN QUANT: CPT | Performed by: EMERGENCY MEDICINE

## 2023-04-11 PROCEDURE — 83605 ASSAY OF LACTIC ACID: CPT | Performed by: EMERGENCY MEDICINE

## 2023-04-11 PROCEDURE — 0241U SARS-COV-2/FLU A AND B/RSV BY PCR (GENEXPERT): CPT | Performed by: EMERGENCY MEDICINE

## 2023-04-11 PROCEDURE — 93005 ELECTROCARDIOGRAM TRACING: CPT

## 2023-04-11 PROCEDURE — 84443 ASSAY THYROID STIM HORMONE: CPT | Performed by: INTERNAL MEDICINE

## 2023-04-11 PROCEDURE — 81003 URINALYSIS AUTO W/O SCOPE: CPT | Performed by: EMERGENCY MEDICINE

## 2023-04-11 PROCEDURE — 87040 BLOOD CULTURE FOR BACTERIA: CPT | Performed by: EMERGENCY MEDICINE

## 2023-04-11 PROCEDURE — 85025 COMPLETE CBC W/AUTO DIFF WBC: CPT | Performed by: EMERGENCY MEDICINE

## 2023-04-11 PROCEDURE — 83605 ASSAY OF LACTIC ACID: CPT

## 2023-04-11 PROCEDURE — 80053 COMPREHEN METABOLIC PANEL: CPT | Performed by: EMERGENCY MEDICINE

## 2023-04-11 RX ORDER — ONDANSETRON 2 MG/ML
4 INJECTION INTRAMUSCULAR; INTRAVENOUS EVERY 4 HOURS PRN
Status: ACTIVE | OUTPATIENT
Start: 2023-04-11 | End: 2023-04-11

## 2023-04-11 RX ORDER — ATORVASTATIN CALCIUM 10 MG/1
10 TABLET, FILM COATED ORAL EVERY MORNING
Status: DISCONTINUED | OUTPATIENT
Start: 2023-04-11 | End: 2023-04-14

## 2023-04-11 RX ORDER — ACETAMINOPHEN 500 MG
1000 TABLET ORAL ONCE
Status: COMPLETED | OUTPATIENT
Start: 2023-04-11 | End: 2023-04-11

## 2023-04-11 RX ORDER — SODIUM CHLORIDE 9 MG/ML
INJECTION, SOLUTION INTRAVENOUS CONTINUOUS
Status: DISCONTINUED | OUTPATIENT
Start: 2023-04-11 | End: 2023-04-12

## 2023-04-11 RX ORDER — DEXTROSE AND SODIUM CHLORIDE 5; .45 G/100ML; G/100ML
INJECTION, SOLUTION INTRAVENOUS CONTINUOUS
Status: ACTIVE | OUTPATIENT
Start: 2023-04-11 | End: 2023-04-11

## 2023-04-11 RX ORDER — ENOXAPARIN SODIUM 100 MG/ML
40 INJECTION SUBCUTANEOUS DAILY
Status: DISCONTINUED | OUTPATIENT
Start: 2023-04-11 | End: 2023-04-14

## 2023-04-11 RX ORDER — VANCOMYCIN HYDROCHLORIDE 125 MG/1
125 CAPSULE ORAL 4 TIMES DAILY
Status: DISCONTINUED | OUTPATIENT
Start: 2023-04-11 | End: 2023-04-14

## 2023-04-11 RX ORDER — POTASSIUM CHLORIDE 20 MEQ/1
40 TABLET, EXTENDED RELEASE ORAL ONCE
Status: COMPLETED | OUTPATIENT
Start: 2023-04-11 | End: 2023-04-11

## 2023-04-11 RX ORDER — ACETAMINOPHEN 500 MG
500 TABLET ORAL EVERY 4 HOURS PRN
Status: DISCONTINUED | OUTPATIENT
Start: 2023-04-11 | End: 2023-04-14

## 2023-04-11 RX ORDER — SODIUM CHLORIDE 9 MG/ML
INJECTION, SOLUTION INTRAVENOUS CONTINUOUS
Status: DISCONTINUED | OUTPATIENT
Start: 2023-04-11 | End: 2023-04-14

## 2023-04-11 NOTE — PROGRESS NOTES
NURSING ADMISSION NOTE      Patient admitted via Cart  Oriented to room. Safety precautions initiated. Bed in low position. Call light in reach.     Received patient AO x 4, on 3L O2, tele NSR, continent of bladder, having diarrhea, no complain  ts of pain, up with a walker x 1, generalized weakness, spouse at the bedside, acclimated to the room, call light features

## 2023-04-11 NOTE — ED QUICK NOTES
Orders for admission, patient is aware of plan and ready to go upstairs. Any questions, please call ED RN [phone]    Patient Covid vaccination status: Fully vaccinated     COVID Test Ordered in ED: SARS-CoV-2/Flu A and B/RSV by PCR (GeneXpert)    COVID Suspicion at Admission: Negative    Running Infusions:       Mental Status/LOC at time of transport: A&Ox3    Other pertinent information: On 2L NC. On pure wick.  C DIFF POSITIVE   CIWA score: N/A   NIH score:  N/A

## 2023-04-11 NOTE — ED INITIAL ASSESSMENT (HPI)
Patient in per EMS from home. A&Ox3 on arrival and with EMS. Family tells medics she has been altered with a fever past few days. Patient alert and responds appropriately. C/o nausea and diarrhea since December. Been feeling fatigued lately.  Denies sick contacts

## 2023-04-12 LAB
ANION GAP SERPL CALC-SCNC: 9 MMOL/L (ref 0–18)
BASOPHILS # BLD: 0 X10(3) UL (ref 0–0.2)
BASOPHILS NFR BLD: 0 %
BUN BLD-MCNC: 12 MG/DL (ref 7–18)
CALCIUM BLD-MCNC: 7.5 MG/DL (ref 8.5–10.1)
CHLORIDE SERPL-SCNC: 106 MMOL/L (ref 98–112)
CO2 SERPL-SCNC: 20 MMOL/L (ref 21–32)
CREAT BLD-MCNC: 0.88 MG/DL
EOSINOPHIL # BLD: 0 X10(3) UL (ref 0–0.7)
EOSINOPHIL NFR BLD: 0 %
ERYTHROCYTE [DISTWIDTH] IN BLOOD BY AUTOMATED COUNT: 12.4 %
GFR SERPLBLD BASED ON 1.73 SQ M-ARVRAT: 70 ML/MIN/1.73M2 (ref 60–?)
GLUCOSE BLD-MCNC: 117 MG/DL (ref 70–99)
HCT VFR BLD AUTO: 34.1 %
HGB BLD-MCNC: 11.4 G/DL
LYMPHOCYTES NFR BLD: 1.2 X10(3) UL (ref 1–4)
LYMPHOCYTES NFR BLD: 12 %
MAGNESIUM SERPL-MCNC: 1.5 MG/DL (ref 1.6–2.6)
MCH RBC QN AUTO: 28.6 PG (ref 26–34)
MCHC RBC AUTO-ENTMCNC: 33.4 G/DL (ref 31–37)
MCV RBC AUTO: 85.7 FL
MONOCYTES # BLD: 1.2 X10(3) UL (ref 0.1–1)
MONOCYTES NFR BLD: 12 %
MORPHOLOGY: NORMAL
NEUTROPHILS # BLD AUTO: 6.78 X10 (3) UL (ref 1.5–7.7)
NEUTROPHILS NFR BLD: 65 %
NEUTS BAND NFR BLD: 11 %
NEUTS HYPERSEG # BLD: 7.6 X10(3) UL (ref 1.5–7.7)
OSMOLALITY SERPL CALC.SUM OF ELEC: 281 MOSM/KG (ref 275–295)
PLATELET # BLD AUTO: 165 10(3)UL (ref 150–450)
PLATELET MORPHOLOGY: NORMAL
POTASSIUM SERPL-SCNC: 3.3 MMOL/L (ref 3.5–5.1)
POTASSIUM SERPL-SCNC: 3.3 MMOL/L (ref 3.5–5.1)
POTASSIUM SERPL-SCNC: 3.4 MMOL/L (ref 3.5–5.1)
RBC # BLD AUTO: 3.98 X10(6)UL
SODIUM SERPL-SCNC: 135 MMOL/L (ref 136–145)
TOTAL CELLS COUNTED BLD: 100
WBC # BLD AUTO: 10 X10(3) UL (ref 4–11)

## 2023-04-12 PROCEDURE — 85025 COMPLETE CBC W/AUTO DIFF WBC: CPT | Performed by: INTERNAL MEDICINE

## 2023-04-12 PROCEDURE — 85027 COMPLETE CBC AUTOMATED: CPT | Performed by: INTERNAL MEDICINE

## 2023-04-12 PROCEDURE — 84132 ASSAY OF SERUM POTASSIUM: CPT | Performed by: HOSPITALIST

## 2023-04-12 PROCEDURE — 97165 OT EVAL LOW COMPLEX 30 MIN: CPT

## 2023-04-12 PROCEDURE — 80048 BASIC METABOLIC PNL TOTAL CA: CPT | Performed by: INTERNAL MEDICINE

## 2023-04-12 PROCEDURE — 85007 BL SMEAR W/DIFF WBC COUNT: CPT | Performed by: INTERNAL MEDICINE

## 2023-04-12 PROCEDURE — 97161 PT EVAL LOW COMPLEX 20 MIN: CPT

## 2023-04-12 PROCEDURE — 97530 THERAPEUTIC ACTIVITIES: CPT

## 2023-04-12 PROCEDURE — 84132 ASSAY OF SERUM POTASSIUM: CPT | Performed by: INTERNAL MEDICINE

## 2023-04-12 PROCEDURE — 97535 SELF CARE MNGMENT TRAINING: CPT

## 2023-04-12 PROCEDURE — 83735 ASSAY OF MAGNESIUM: CPT | Performed by: INTERNAL MEDICINE

## 2023-04-12 RX ORDER — SIMETHICONE 80 MG
80 TABLET,CHEWABLE ORAL 4 TIMES DAILY PRN
Status: DISCONTINUED | OUTPATIENT
Start: 2023-04-12 | End: 2023-04-14

## 2023-04-12 RX ORDER — DULOXETIN HYDROCHLORIDE 30 MG/1
30 CAPSULE, DELAYED RELEASE ORAL DAILY
Status: DISCONTINUED | OUTPATIENT
Start: 2023-04-12 | End: 2023-04-12

## 2023-04-12 RX ORDER — LEVOTHYROXINE SODIUM 0.12 MG/1
125 TABLET ORAL
COMMUNITY

## 2023-04-12 RX ORDER — DULOXETIN HYDROCHLORIDE 30 MG/1
30 CAPSULE, DELAYED RELEASE ORAL 2 TIMES DAILY
Status: DISCONTINUED | OUTPATIENT
Start: 2023-04-12 | End: 2023-04-14

## 2023-04-12 RX ORDER — LEVOTHYROXINE SODIUM 0.12 MG/1
125 TABLET ORAL
Status: DISCONTINUED | OUTPATIENT
Start: 2023-04-13 | End: 2023-04-14

## 2023-04-12 RX ORDER — MAGNESIUM OXIDE 400 MG/1
800 TABLET ORAL ONCE
Status: COMPLETED | OUTPATIENT
Start: 2023-04-12 | End: 2023-04-12

## 2023-04-12 RX ORDER — CALCIUM CARBONATE 200(500)MG
1000 TABLET,CHEWABLE ORAL DAILY
Status: DISCONTINUED | OUTPATIENT
Start: 2023-04-13 | End: 2023-04-14

## 2023-04-12 RX ORDER — POTASSIUM CHLORIDE 20 MEQ/1
40 TABLET, EXTENDED RELEASE ORAL ONCE
Status: COMPLETED | OUTPATIENT
Start: 2023-04-12 | End: 2023-04-12

## 2023-04-12 RX ORDER — DULOXETIN HYDROCHLORIDE 30 MG/1
30 CAPSULE, DELAYED RELEASE ORAL 2 TIMES DAILY
COMMUNITY

## 2023-04-12 NOTE — PLAN OF CARE
AO x4. 1L at rest. Tele. Incontinent at times. Diarrhea, +Cdiff. PO Vanco. No reports of pain. Tmax 100.9. Ice packs applied and tylenol given. No reports of pain. Up x1 with walker. IVF infusing. Clear liquids diet. Pt updated on POC. No further needs at this time. Spouse at bedside and updated.     Problem: Patient/Family Goals  Goal: Patient/Family Long Term Goal  Description: Patient's Long Term Goal:     Interventions:  -   - See additional Care Plan goals for specific interventions  Outcome: Progressing  Goal: Patient/Family Short Term Goal  Description: Patient's Short Term Goal:     Interventions:   -   - See additional Care Plan goals for specific interventions  Outcome: Progressing

## 2023-04-12 NOTE — PLAN OF CARE
Patient is A/Ox4. VSS. Afebrile. Patient denies pain. Patient is on RA. . Lung sounds diminished. No Cough/SOB. on Tele NSR. Lovenox. SCD on. Electrolyte protocol-non cardiac. Regular Diet. Denies any N/V. Chronic diarrhea. C-diff (+). PO ABX. Ambulates. Patient and family updated on plan of care.     Problem: Patient/Family Goals  Goal: Patient/Family Long Term Goal  Description: Patient's Long Term Goal: Dc to home    Interventions:  - follow plan of care  - See additional Care Plan goals for specific interventions  Outcome: Progressing  Goal: Patient/Family Short Term Goal  Description: Patient's Short Term Goal: manage diarrhea    Interventions:   - control diarrhea  - See additional Care Plan goals for specific interventions  Outcome: Progressing

## 2023-04-12 NOTE — PROGRESS NOTES
04/12/23 1214   Clinical Encounter Type   Visited With Patient   Routine Visit Introduction   Taxonomy   Intended Effects Promote a sense of peace   Methods Collaborate with care team member;Offer support   Interventions Acknowledge current situation;Assist someone with Advance Directives      responded to consult request for POA.  met with patient,  at bedside. Both wanted POA paperwork.  gave copies and explained process. Patient would like time to fill out.  advised to have nurse call spiritual care once patient is ready to complete.  remains available for support. Spiritual Care support can be requested via an Epic consult. YANNICK Pritchard. Div  Extension:[phone]

## 2023-04-12 NOTE — PROGRESS NOTES
04/12/23 1135   Clinical Encounter Type   Visited With Patient   Routine Visit Introduction   Taxonomy   Intended Effects Promote a sense of peace   Methods Collaborate with care team member;Offer support   Interventions Acknowledge current situation; Active listening; Ask guided questions      responded to consult request for Spiritual Care visit.  met briefly with patient. Family at bedside explained that patient wanted visit from local [place] (Nate Timur and Michelle Peck).  called local [place] for this request. Princess Dennis followed up with patient to let them know  had been called.  remains available for ongoing support. Spiritual Care support can be requested via an [facility] consult. ZACARIAS Dao Div  Extension:[phone]

## 2023-04-13 LAB
ANION GAP SERPL CALC-SCNC: 7 MMOL/L (ref 0–18)
BUN BLD-MCNC: 11 MG/DL (ref 7–18)
CALCIUM BLD-MCNC: 8 MG/DL (ref 8.5–10.1)
CHLORIDE SERPL-SCNC: 106 MMOL/L (ref 98–112)
CO2 SERPL-SCNC: 23 MMOL/L (ref 21–32)
CREAT BLD-MCNC: 0.72 MG/DL
GFR SERPLBLD BASED ON 1.73 SQ M-ARVRAT: 89 ML/MIN/1.73M2 (ref 60–?)
GLUCOSE BLD-MCNC: 107 MG/DL (ref 70–99)
MAGNESIUM SERPL-MCNC: 2.3 MG/DL (ref 1.6–2.6)
OSMOLALITY SERPL CALC.SUM OF ELEC: 282 MOSM/KG (ref 275–295)
POTASSIUM SERPL-SCNC: 3.2 MMOL/L (ref 3.5–5.1)
POTASSIUM SERPL-SCNC: 3.2 MMOL/L (ref 3.5–5.1)
SODIUM SERPL-SCNC: 136 MMOL/L (ref 136–145)

## 2023-04-13 PROCEDURE — 83735 ASSAY OF MAGNESIUM: CPT | Performed by: HOSPITALIST

## 2023-04-13 PROCEDURE — 80048 BASIC METABOLIC PNL TOTAL CA: CPT | Performed by: HOSPITALIST

## 2023-04-13 PROCEDURE — 84132 ASSAY OF SERUM POTASSIUM: CPT | Performed by: HOSPITALIST

## 2023-04-13 RX ORDER — DICLOFENAC SODIUM AND MISOPROSTOL 75; 200 MG/1; UG/1
1 TABLET, DELAYED RELEASE ORAL 2 TIMES DAILY PRN
Status: DISCONTINUED | OUTPATIENT
Start: 2023-04-13 | End: 2023-04-14

## 2023-04-13 RX ORDER — POTASSIUM CHLORIDE 20 MEQ/1
40 TABLET, EXTENDED RELEASE ORAL ONCE
Status: COMPLETED | OUTPATIENT
Start: 2023-04-13 | End: 2023-04-13

## 2023-04-13 NOTE — PLAN OF CARE
See flowsheets. Axo x 4, hx of bells palsy, left sided weakness, glasses. RA.  sating well > 94%. Afebrile. Tele-SR. Pull ups, continent. Still having loose stools, PO vanco. Lovenox. Refused SCDs. Denies pain. Up at [place]. Regular diet. Updated patient on plan of care. Frequent roundings, needs met. Call light within reach. All questions answered. VSS.      Problem: Patient/Family Goals  Goal: Patient/Family Long Term Goal  Description: Patient's Long Term Goal: Discharge home with proper resources    Interventions:  - Follow plan of care  - See additional Care Plan goals for specific interventions  Outcome: Progressing  Goal: Patient/Family Short Term Goal  Description: Patient's Short Term Goal:   4/12 ([address] [facility]): Able to sleep well, relieve diarrhea    Interventions:   - PO vanco  -Dim lighting  -Cluster care  -Offer sleeping aid  -Offer sleeping kit  - See additional Care Plan goals for specific interventions  Outcome: Progressing

## 2023-04-13 NOTE — PROGRESS NOTES
Received pt around 0730. Pt AOx4. VSS on RA. Tele SR. Pt continues to have diarrhea, but some improvement. PO vanco. Tolerating diet. Pt up ad [place]. Pt updated on poc. Hopeful dc tomorrow.

## 2023-04-14 VITALS
BODY MASS INDEX: 29 KG/M2 | HEART RATE: 64 BPM | DIASTOLIC BLOOD PRESSURE: 53 MMHG | RESPIRATION RATE: 18 BRPM | SYSTOLIC BLOOD PRESSURE: 117 MMHG | OXYGEN SATURATION: 99 % | WEIGHT: 190 LBS | TEMPERATURE: 98 F

## 2023-04-14 LAB
ANION GAP SERPL CALC-SCNC: 4 MMOL/L (ref 0–18)
BUN BLD-MCNC: 11 MG/DL (ref 7–18)
CALCIUM BLD-MCNC: 8.1 MG/DL (ref 8.5–10.1)
CHLORIDE SERPL-SCNC: 109 MMOL/L (ref 98–112)
CO2 SERPL-SCNC: 24 MMOL/L (ref 21–32)
CREAT BLD-MCNC: 0.76 MG/DL
GFR SERPLBLD BASED ON 1.73 SQ M-ARVRAT: 84 ML/MIN/1.73M2 (ref 60–?)
GLUCOSE BLD-MCNC: 96 MG/DL (ref 70–99)
MAGNESIUM SERPL-MCNC: 2.3 MG/DL (ref 1.6–2.6)
OSMOLALITY SERPL CALC.SUM OF ELEC: 283 MOSM/KG (ref 275–295)
POTASSIUM SERPL-SCNC: 3.2 MMOL/L (ref 3.5–5.1)
POTASSIUM SERPL-SCNC: 3.2 MMOL/L (ref 3.5–5.1)
SODIUM SERPL-SCNC: 137 MMOL/L (ref 136–145)

## 2023-04-14 PROCEDURE — 80048 BASIC METABOLIC PNL TOTAL CA: CPT | Performed by: HOSPITALIST

## 2023-04-14 PROCEDURE — 83735 ASSAY OF MAGNESIUM: CPT | Performed by: HOSPITALIST

## 2023-04-14 PROCEDURE — 84132 ASSAY OF SERUM POTASSIUM: CPT | Performed by: HOSPITALIST

## 2023-04-14 RX ORDER — VANCOMYCIN HYDROCHLORIDE 125 MG/1
125 CAPSULE ORAL 4 TIMES DAILY
Qty: 56 CAPSULE | Refills: 0 | Status: SHIPPED | OUTPATIENT
Start: 2023-04-14

## 2023-04-14 RX ORDER — POTASSIUM CHLORIDE 20 MEQ/1
40 TABLET, EXTENDED RELEASE ORAL ONCE
Status: COMPLETED | OUTPATIENT
Start: 2023-04-14 | End: 2023-04-14

## 2023-04-14 RX ORDER — ONDANSETRON 4 MG/1
4 TABLET, ORALLY DISINTEGRATING ORAL EVERY 4 HOURS PRN
Qty: 20 TABLET | Refills: 0 | Status: SHIPPED | OUTPATIENT
Start: 2023-04-14

## 2023-04-14 NOTE — PROGRESS NOTES
23 1235   Over the last 2 weeks, how often have you been bothered by any of the following problems? Little interest or pleasure in doing things 0   Feeling down, depressed, or hopeless 0   Trouble falling or staying asleep, or sleeping too much 1   Feeling tired or having little energy 1   Poor appetite or overeating 0   Feeling bad about yourself - or that you are a failure or have let yourself or your family down 0   Trouble concentrating on things, such as reading the newspaper or watching television 0   Moving or speaking so slowly that other people could have noticed. Or the opposite - being so fidgety or restless that you have been moving around a lot more than usual 0   Thoughts that you would be better off dead, or of hurting yourself in some way 0   PHQ-9 TOTAL SCORE 2   If you checked off any problems, how difficult have these problems made it for you to do your work, take care of things at home, or get along with other people? Not difficult at all     [address] [facility] Resource Referral Counselor Note    Lalo Montesinos Patient Status:  Inpatient    1950 MRN [id number]   [facility] 5NW-A Attending Tony Whiteside, [address] [facility] [address] Day [address] 3 PCP Carol Smith MD       S(subjective) Patient states when she came into the hospital she had a high fever and really don't remember a lot. So she doesn't remember the phq4 questions. Pablito Mathew admits to being on Cymbalta per PCP and feels it \"takes the edge of things\". She denies any suicidal thoughts. O(objective) The patient is alert and oriented x4. Her mood and affect is wnl. A(assessment) The phq9 was completed. P(plan) The patient will continue to follow up with her pcp.       Ailin Figueroa RN  2023  12:36 PM

## 2023-04-14 NOTE — PLAN OF CARE
Alert and oriented. Sats WNL on room air. NSR on tele. Voids. C/o low appetite- snacks provided at bedside per pt request. C/o diarrhea episodes frequently but denies any c/o nausea or vomiting. Up independently. Update pt and  at bedside on POC. Verbalized understanding, no questions at this time. Call light within reach.      Problem: Patient/Family Goals  Goal: Patient/Family Long Term Goal  Description: Patient's Long Term Goal: Discharge home with proper resources    Interventions:  - Follow plan of care  - See additional Care Plan goals for specific interventions  4/14/2023 0008 by Eryn Travis RN  Outcome: Progressing  4/14/2023 0007 by Eryn Travis RN  Outcome: Progressing  Goal: Patient/Family Short Term Goal  Description: Patient's Short Term Goal:   4/12 ([address] [place]): Able to sleep well, relieve diarrhea    Interventions:   - PO vanco  -Dim lighting  -Cluster care  -Offer sleeping aid  -Offer sleeping kit  - See additional Care Plan goals for specific interventions  4/14/2023 0008 by Eryn Travis RN  Outcome: Progressing  4/14/2023 0007 by Eryn Travis RN  Outcome: Progressing     Problem: RESPIRATORY - ADULT  Goal: Achieves optimal ventilation and oxygenation  Description: INTERVENTIONS:  - Assess for changes in respiratory status  - Assess for changes in mentation and behavior  - Position to facilitate oxygenation and minimize respiratory effort  - Oxygen supplementation based on oxygen saturation or ABGs  - Provide Smoking Cessation handout, if applicable  - Encourage broncho-pulmonary hygiene including cough, deep breathe, Incentive Spirometry  - Assess the need for suctioning and perform as needed  - Assess and instruct to report SOB or any respiratory difficulty  - Respiratory Therapy support as indicated  - Manage/alleviate anxiety  - Monitor for signs/symptoms of CO2 retention  Outcome: Progressing

## 2023-04-14 NOTE — PLAN OF CARE
Problem: Patient/Family Goals  Goal: Patient/Family Long Term Goal  Description: Patient's Long Term Goal: Discharge home with proper resources    Interventions:  - Follow plan of care  - See additional Care Plan goals for specific interventions  Outcome: Progressing  Goal: Patient/Family Short Term Goal  Description: Patient's Short Term Goal:   4/12 ([address] [place]): Able to sleep well, relieve diarrhea    Interventions:   - PO vanco  -Dim lighting  -Cluster care  -Offer sleeping aid  -Offer sleeping kit  - See additional Care Plan goals for specific interventions  Outcome: Progressing

## 2023-04-14 NOTE — PROGRESS NOTES
04/14/23 1146   Advance Directives for Healthcare   Does the patient have:  Power of  for Healthcare   Is there a copy on the chart? Yes   Healthcare Agent Appointed Yes   Healthcare Agent's Name Ga Sunshine Agent's Phone Number [phone]   Created a new POA for Healthcare document that, per the patient, accurately reflects their wishes. Gave the patient the original [facility] document. A copy of the new POA document has been placed on the patient's paper chart. Spiritual care can be requested via an [facility] consult.

## 2024-09-25 NOTE — CONSULTS
[facility] Hospitalist Initial Consult       Reason for consult: Medical Management sp L UZMA      History of Present Illness: Patient is a 78year old female with PMH sig for OA  who presents sp UZMA.    They tolerated the procedure well without any immediate compl COLONOSCOPY, POSSIBLE BIOPSY, POSSIBLE POLYPECTOMY 48019 N/A 8/7/2020    Performed by Naz Cunha MD at [facility]   • OTHER  2009    transforaminal lumbar interbody fusion   • OTHER  2014    left foot surgery   • TRANSFORAMINAL EPIDURAL masses,  No organomegaly. Non distended   Extremities: Dressing c/d/i. Skin: Skin color, texture, turgor normal. No rashes or lesions.     Neurologic: Normal strength, no focal deficit appreciated     Laboratory:  No results for input(s): WBC, HGB, MCV, Domenic

## (undated) DEVICE — CAUTERY BLADE 2IN INS E1455

## (undated) DEVICE — SHEET,DRAPE,70X100,STERILE: Brand: MEDLINE

## (undated) DEVICE — SUTURE FIBERWIRE S AR-7200

## (undated) DEVICE — LIGHT HANDLE

## (undated) DEVICE — HARMONIC FOCUS SHEARS 9CM LENGTH + ADAPTIVE TISSUE TECHNOLOGY FOR USE WITH BLUE HAND PIECE ONLY: Brand: HARMONIC FOCUS

## (undated) DEVICE — STERILE POLYISOPRENE POWDER-FREE SURGICAL GLOVES: Brand: PROTEXIS

## (undated) DEVICE — EXOFIN TISSUE ADHESIVE 1.0ML

## (undated) DEVICE — 3M™ STERI-DRAPE™ INSTRUMENT POUCH 1018: Brand: STERI-DRAPE™

## (undated) DEVICE — BLADE ELECTRODE: Brand: EDGE

## (undated) DEVICE — ANTERIOR HIP: Brand: MEDLINE INDUSTRIES, INC.

## (undated) DEVICE — Device: Brand: POWER-FLO®

## (undated) DEVICE — UNDYED BRAIDED (POLYGLACTIN 910), SYNTHETIC ABSORBABLE SUTURE: Brand: COATED VICRYL

## (undated) DEVICE — BIPOLAR SEALER 23-112-1 AQM 6.0: Brand: AQUAMANTYS™

## (undated) DEVICE — Device: Brand: STABLECUT®

## (undated) DEVICE — DERMABOND LIQUID ADHESIVE

## (undated) DEVICE — PREM ST/G7 CP/E1 AA DM LN CER: Type: IMPLANTABLE DEVICE

## (undated) DEVICE — SOL  .9 3000ML

## (undated) DEVICE — COVER,BOOT,FOAM,NON-SKID,HOOK-LOOP,XLG: Brand: MEDLINE INDUSTRIES, INC.

## (undated) DEVICE — HEMOCLIP HORIZON MED 002200

## (undated) DEVICE — SYRINGE 30ML LL TIP

## (undated) DEVICE — SUTURE VICRYL 2-0 CP-1

## (undated) DEVICE — CASED DISP BIPOLAR CORD

## (undated) DEVICE — AIRWAY ENDO  TRIVANTAGE 7MM

## (undated) DEVICE — CHLORAPREP ORANGE TINT 10.5ML

## (undated) DEVICE — 1016 S-DRAPE IRRIG POUCH 10/BOX: Brand: STERI-DRAPE™

## (undated) DEVICE — 1010 S-DRAPE TOWEL DRAPE 10/BX: Brand: STERI-DRAPE™

## (undated) DEVICE — SUTURE VICRYL 0 CP-1

## (undated) DEVICE — SUTURE STRATAFIX PDS PLUS 45CM

## (undated) DEVICE — 3M™ IOBAN™ 2 ANTIMICROBIAL INCISE DRAPE 6650EZ: Brand: IOBAN™ 2

## (undated) DEVICE — POSITIONER OR KT PT CR

## (undated) DEVICE — NEEDLE SPINAL 18X3-1/2 PINK.

## (undated) DEVICE — HOOD, PEEL-AWAY: Brand: FLYTE

## (undated) DEVICE — SUTURE MONOCRYL 3-0 PS-2

## (undated) DEVICE — RETRACT LONE STAR STAYS DULL

## (undated) DEVICE — ARISTA 1 GRAM

## (undated) DEVICE — STERILE SYNTHETIC POLYISOPRENE POWDER-FREE SURGICAL GLOVES WITH HYDROGEL COATING, SMOOTH FINISH, STRAIGHT FINGER: Brand: PROTEXIS

## (undated) DEVICE — SPONGE: SPECIALTY PEANUT XR 100/CS: Brand: MEDICAL ACTION INDUSTRIES

## (undated) DEVICE — PAD SACRAL SPAN AID

## (undated) DEVICE — WRAP COOLING HIP W/ICE PILLOW

## (undated) DEVICE — Device: Brand: BOOT LINER, DISPOSABLE

## (undated) DEVICE — SUTURE VICRYL 3-0 SH

## (undated) DEVICE — SCD SLEEVE KNEE HI BLEND

## (undated) DEVICE — SOL  .9 1000ML BTL

## (undated) DEVICE — PROBE 8225101 5PK STD PRASS FL TIP ROHS

## (undated) DEVICE — MEDI-VAC SUCTION FINE CAPACITY: Brand: CARDINAL HEALTH

## (undated) DEVICE — HEAD AND NECK CDS-LF: Brand: MEDLINE INDUSTRIES, INC.

## (undated) NOTE — IP AVS SNAPSHOT
Patient Demographics     Address  [id number] [place] [address] Phone  [phone] [facility])  [phone] (Mobile) *Preferred* E-mail Address  [email or web address]      Emergency Contact(s)     Name Relation Home Work [address] ? Do not drive until cleared by surgeon. This is usually four to six weeks after surgery. Discuss this at follow-up office visit. ? Not allowed while taking narcotic pain medication or muscle relaxants. Sex  ?  Usually allowed after four to six weeks – ? Contact your physician if you have signs of bruising, nose bleeds or blood in your urine. Use electric razors and soft toothbrushes only. ? Do not take aspirin while taking blood thinners unless ordered by your physician. ?  Review anticoagulant educati ? Use stool softeners such as Colace or Senakot while on narcotics, and laxatives such as Miralax or Milk of Magnesia if needed. ? An enema or suppository may be needed if above measures do not work.       Prevention of infection and promotion of healing ? Separation of incision line. ? Increased redness, swelling, or warmth of skin around incision. ? Increased or foul smelling drainage from incision  ? Red streaks on skin near incision. ?  Temperature >101 F.  ? Increased pain at incision not relieved b ? TEMPORARY HANDICAP PARKING APPLICATION  (good for 3-6 months)  – At Surgeon or PCP visit, request they fill out the form, then go to [facility] (only time you do not wait in a long line there). Some [facility] offices provide the same service.  ( Tobar Stop taking on: March 18, 2021   Aidan Irizarry MD         Naloxone HCl 4 MG/0.1ML Liqd      4 mg by Nasal route as needed. If patient remains unresponsive, repeat dose in other nostril 2-5 minutes after first dose.    Aidan Irizarry MD         Ondansetron [id number] Midazolam HCl (VERSED) 2 MG/2ML injection 1 mg 02/15/21 1640 Given      [id number] Prochlorperazine Edisylate (COMPAZINE) injection 10 mg 02/15/21 1831 Given      [id number] Senna (SENOKOT) tab 17.2 mg 02/15/21 2100 Given      [id number] acetaminop Microbiology Results (All)     None      Pending Labs     Order Current Status    SURGICAL PATHOLOGY TISSUE Collected (02/15/21 8649)      H&P Note    No notes of this type exist for this encounter.         Consults - MD Consult Notes      Consults signed b •  Naloxone HCl 4 MG/0.1ML Nasal Liquid, 4 mg by Nasal route as needed. If patient remains unresponsive, repeat dose in other nostril 2-5 minutes after first dose.           ALL:  No Known Allergies   Past Medical History:   Diagnosis Date   • Arrhythmia Head:  Normocephalic, without obvious abnormality, atraumatic. Eyes:  Sclera anicteric, No conjunctival pallor, EOMs intact. Nose: Nares normal. Septum midline. Mucosa normal. No drainage.    Throat: Lips, mucosa, and tongue normal. Teeth and gums norm Electronically signed by Jori Travis MD on 2/16/2021 11:18 AM   Attribution H[employer]ard    JS. 1 - Jori Travis MD on 2/16/2021 11:17 AM                     D/C Summary    No notes of this type exist for this encounter.         Physical Therapy No Lives With: Spouse  Drives: Yes  Patient Owned Equipment: Cane  Patient Regularly Uses: Reading glasses    Prior Level of [place]: Pt reports ind pta, did not use assistive device.   Pt lives with supportive spouse who will be able to assist as needed Distance (ft): 150'x2  Assistive Device: Rolling walker  Pattern: Within Functional Limits     Comment : 4 stairs with railing and cane with supervision    Skilled Therapy Provided:  Pt recd in recliner, spouse present.   Pt educated in role of PT, ankle pu Pt seen for PT for rom, strengthening, gait training on levels and stairs, simulated car transfers. Pt currently at mod ind/supervision level for all fxal mobility and has met all PT goals.  Results of the AM-PAC \"6 clicks\" Inpatient Daily Mobility Short Presenting Problem: s/p L UZMA on 2/15     Physician Order: IP Consult to Occupational Therapy  Reason for Therapy:  ADL/IADL Dysfunction and Discharge Planning    History related to current admission: Patient is a 78year old female admitted on 2/15/2021 f Prior Level of Function: Pt lives at home with her . Pt is typically independent with all ADL and functional mobility without a device. Pt's spouse can assist as needed. SUBJECTIVE   \"My  can help me. \"     Patient self-stated goal is to Skilled Therapy Provided: Pt was found in bed in a semi-supine position. Pt performed supine>sit EOB with supervision assistance. Pt performed UB/LB dressing with set-up and supervision while sitting at EOB.  Pt performed sit>stand with RW and supervision a Overall Complexity  LOW     OT Discharge Recommendations: Home; Intermittent Supervision  OT Device Recommendations: None    PLAN   Patient has been evaluated and presents with no skilled Occupational Therapy needs at this time.   Patient discharged from [facility] - See additional Care Plan goals for specific interventions

## (undated) NOTE — LETTER
OUTSIDE TESTING RESULT REQUEST     IMPORTANT: FOR YOUR IMMEDIATE ATTENTION  Please FAX all test results listed below to: [phone]     Testing already done on or about: 2022     * * * * If testing is NOT complete, arrange with patient A.S.A.P. * * * *      Patient Name: Hawa Wilson  Surgery Date: 2022  CSN: [id number]  Medical Record: [id number]   : 1950 - A: 73 y      Sex: female  Surgeon(s):  Catracho eKller MD  Procedure: RIGHT ANTERIOR TOTAL HIP ARTHROPLASTY  Anesthesia Type: Choice     Surgeon: Catracho Keller MD     The following Testing and Time Line are REQUIRED PER ANESTHESIA     EKG READ AND SIGNED WITHIN   90 days  CBC [with Differential & Platelets] within  90 days  CMP (requires 4 hour fast) within  90 days  PT/INR within  30 days  PTT within  30 days  UA with Reflex to Culture within  30 days      Thank You,   Sent by: Vonnie Bourne RN

## (undated) NOTE — LETTER
OUTSIDE TESTING RESULT REQUEST     IMPORTANT: FOR YOUR IMMEDIATE ATTENTION  Please FAX all test results listed below to: [phone]        * * * * If testing is NOT complete, arrange with patient A.S.A.P. * * * *      Patient Name: Leesa Garcia  Surgery Date: 2022  CSN: [id number]  Medical Record: [id number]   : 1950 - A: 73 y      Sex: female  Surgeon(s):  Aguilar Burks MD  Procedure: RIGHT ANTERIOR TOTAL HIP ARTHROPLASTY  Anesthesia Type: Choice     Surgeon: Aguilar Burks MD     The following Testing and Time Line are REQUIRED PER ANESTHESIA     EKG READ AND SIGNED WITHIN   90 days  CBC [with Differential & Platelets] within  90 days  PT/INR within  30 days  PTT within  30 days  UA with Reflex to Culture within  30 days  MSSA/MRSA Nasal screening within 30 days      Thank You,   Sent by: Miracle Birch RN

## (undated) NOTE — LETTER
OUTSIDE TESTING RESULT REQUEST     IMPORTANT: FOR YOUR IMMEDIATE ATTENTION  Please FAX all test results listed below to: [phone]        * * * * If testing is NOT complete, arrange with patient A.S.A.P. * * * *      Patient Name: Leesa Garcia  Surgery Date: 2022  CSN: [id number]  Medical Record: [id number]   : 1950 - A: 73 y      Sex: female  Surgeon(s):  Aguilar Burks MD  Procedure: RIGHT ANTERIOR TOTAL HIP ARTHROPLASTY  Anesthesia Type: Choice     Surgeon: Aguilar Burks MD     The following Testing and Time Line are REQUIRED PER ANESTHESIA     EKG READ AND SIGNED WITHIN   90 days  CBC [with Differential & Platelets] within  30 days  PT/INR within  30 days  PTT within  30 days  UA with Reflex to Culture within  30 days  MSSA/MRSA Nasal screening within 30 days      Thank You,   Sent by: Pippa Jackson

## (undated) NOTE — LETTER
Brandi B[address]  [address] [facility] [address] [facility]  Authorization for Surgical Operation and Procedure     Date:___________                                                                                                         Time:__________ potential risks that can occur: fever and allergic reactions, hemolytic reactions, transmission of diseases such as Hepatitis, AIDS and Cytomegalovirus (CMV) and fluid overload.   In the event that I wish to have an autologous transfusion of my own blood, o will determine when the applicable recovery period ends for purposes of reinstating the DNAR order.   10. Patients having a sterilization procedure: I understand that if the procedure is successful the results will be permanent and it will therefore be impo anesthesiologist (anesthesia doctor) to give me medicine and do additional procedures as necessary.  Some examples are: Starting or using an “IV” to give me medicine, fluids or blood during my procedure, and having a breathing tube placed to help me breathe “epidural”, & “nerve blocks”): I understand that rare but potential complications include headache, bleeding, infection, seizure, irregular heart rhythms, and nerve injury.     I can change my mind about having anesthesia services at any time before I get

## (undated) NOTE — LETTER
[facility] Testing Department  Phone: [phone]  Right Fax: [phone]  [address] Kevin Ji RN Date: 21    Patient Name: Santosh Karla  Surgery Date: 2/15/2021    CSN: [id number]  Medical Record: [id number]